# Patient Record
Sex: FEMALE | Race: WHITE | NOT HISPANIC OR LATINO | Employment: FULL TIME | URBAN - METROPOLITAN AREA
[De-identification: names, ages, dates, MRNs, and addresses within clinical notes are randomized per-mention and may not be internally consistent; named-entity substitution may affect disease eponyms.]

---

## 2017-03-07 ENCOUNTER — GENERIC CONVERSION - ENCOUNTER (OUTPATIENT)
Dept: OTHER | Facility: OTHER | Age: 32
End: 2017-03-07

## 2017-03-28 ENCOUNTER — ALLSCRIPTS OFFICE VISIT (OUTPATIENT)
Dept: OTHER | Facility: OTHER | Age: 32
End: 2017-03-28

## 2017-10-11 ENCOUNTER — GENERIC CONVERSION - ENCOUNTER (OUTPATIENT)
Dept: OTHER | Facility: OTHER | Age: 32
End: 2017-10-11

## 2017-10-25 ENCOUNTER — ALLSCRIPTS OFFICE VISIT (OUTPATIENT)
Dept: OTHER | Facility: OTHER | Age: 32
End: 2017-10-25

## 2017-10-25 DIAGNOSIS — E66.9 OBESITY: ICD-10-CM

## 2017-10-25 DIAGNOSIS — Z34.01 ENCOUNTER FOR SUPERVISION OF NORMAL FIRST PREGNANCY IN FIRST TRIMESTER: ICD-10-CM

## 2017-11-06 ENCOUNTER — APPOINTMENT (OUTPATIENT)
Dept: LAB | Facility: HOSPITAL | Age: 32
End: 2017-11-06
Payer: COMMERCIAL

## 2017-11-06 ENCOUNTER — TRANSCRIBE ORDERS (OUTPATIENT)
Dept: LAB | Facility: HOSPITAL | Age: 32
End: 2017-11-06

## 2017-11-06 DIAGNOSIS — Z34.01 ENCOUNTER FOR SUPERVISION OF NORMAL FIRST PREGNANCY IN FIRST TRIMESTER: ICD-10-CM

## 2017-11-06 DIAGNOSIS — E66.9 OBESITY: ICD-10-CM

## 2017-11-06 LAB
ABO GROUP BLD: NORMAL
BASOPHILS # BLD AUTO: 0.02 THOUSANDS/ΜL (ref 0–0.1)
BASOPHILS NFR BLD AUTO: 0 % (ref 0–1)
BILIRUB UR QL STRIP: NEGATIVE
BLD GP AB SCN SERPL QL: NEGATIVE
CLARITY UR: NORMAL
COLOR UR: YELLOW
EOSINOPHIL # BLD AUTO: 0.21 THOUSAND/ΜL (ref 0–0.61)
EOSINOPHIL NFR BLD AUTO: 2 % (ref 0–6)
ERYTHROCYTE [DISTWIDTH] IN BLOOD BY AUTOMATED COUNT: 12.4 % (ref 11.6–15.1)
GLUCOSE UR STRIP-MCNC: NEGATIVE MG/DL
HCT VFR BLD AUTO: 36.6 % (ref 34.8–46.1)
HGB BLD-MCNC: 12.8 G/DL (ref 11.5–15.4)
HGB UR QL STRIP.AUTO: NEGATIVE
KETONES UR STRIP-MCNC: NEGATIVE MG/DL
LEUKOCYTE ESTERASE UR QL STRIP: NEGATIVE
LYMPHOCYTES # BLD AUTO: 3.56 THOUSANDS/ΜL (ref 0.6–4.47)
LYMPHOCYTES NFR BLD AUTO: 36 % (ref 14–44)
MCH RBC QN AUTO: 30.4 PG (ref 26.8–34.3)
MCHC RBC AUTO-ENTMCNC: 35 G/DL (ref 31.4–37.4)
MCV RBC AUTO: 87 FL (ref 82–98)
MONOCYTES # BLD AUTO: 0.71 THOUSAND/ΜL (ref 0.17–1.22)
MONOCYTES NFR BLD AUTO: 7 % (ref 4–12)
NEUTROPHILS # BLD AUTO: 5.5 THOUSANDS/ΜL (ref 1.85–7.62)
NEUTS SEG NFR BLD AUTO: 55 % (ref 43–75)
NITRITE UR QL STRIP: NEGATIVE
NRBC BLD AUTO-RTO: 0 /100 WBCS
PH UR STRIP.AUTO: 6.5 [PH] (ref 4.5–8)
PLATELET # BLD AUTO: 250 THOUSANDS/UL (ref 149–390)
PMV BLD AUTO: 9.9 FL (ref 8.9–12.7)
PROT UR STRIP-MCNC: NEGATIVE MG/DL
RBC # BLD AUTO: 4.21 MILLION/UL (ref 3.81–5.12)
RH BLD: POSITIVE
RUBV IGG SERPL IA-ACNC: 55.4 IU/ML
SP GR UR STRIP.AUTO: 1.01 (ref 1–1.03)
UROBILINOGEN UR QL STRIP.AUTO: 0.2 E.U./DL
WBC # BLD AUTO: 10.03 THOUSAND/UL (ref 4.31–10.16)

## 2017-11-06 PROCEDURE — 82950 GLUCOSE TEST: CPT

## 2017-11-06 PROCEDURE — 87086 URINE CULTURE/COLONY COUNT: CPT

## 2017-11-06 PROCEDURE — 81003 URINALYSIS AUTO W/O SCOPE: CPT

## 2017-11-06 PROCEDURE — 80081 OBSTETRIC PANEL INC HIV TSTG: CPT

## 2017-11-06 PROCEDURE — 36415 COLL VENOUS BLD VENIPUNCTURE: CPT

## 2017-11-07 LAB
BACTERIA UR CULT: NORMAL
GLUCOSE 1H P 50 G GLC PO SERPL-MCNC: 74 MG/DL
HBV SURFACE AG SER QL: NORMAL
RPR SER QL: NORMAL

## 2017-11-08 LAB — HIV 1+2 AB+HIV1 P24 AG SERPL QL IA: NORMAL

## 2017-11-10 ENCOUNTER — GENERIC CONVERSION - ENCOUNTER (OUTPATIENT)
Dept: OTHER | Facility: OTHER | Age: 32
End: 2017-11-10

## 2017-11-10 ENCOUNTER — LAB REQUISITION (OUTPATIENT)
Dept: LAB | Facility: HOSPITAL | Age: 32
End: 2017-11-10
Payer: COMMERCIAL

## 2017-11-10 ENCOUNTER — ALLSCRIPTS OFFICE VISIT (OUTPATIENT)
Dept: PERINATAL CARE | Facility: CLINIC | Age: 32
End: 2017-11-10
Payer: COMMERCIAL

## 2017-11-10 DIAGNOSIS — Z34.01 ENCOUNTER FOR SUPERVISION OF NORMAL FIRST PREGNANCY IN FIRST TRIMESTER: ICD-10-CM

## 2017-11-10 PROCEDURE — 87591 N.GONORRHOEAE DNA AMP PROB: CPT | Performed by: NURSE PRACTITIONER

## 2017-11-10 PROCEDURE — 76813 OB US NUCHAL MEAS 1 GEST: CPT | Performed by: OBSTETRICS & GYNECOLOGY

## 2017-11-10 PROCEDURE — 87491 CHLMYD TRACH DNA AMP PROBE: CPT | Performed by: NURSE PRACTITIONER

## 2017-11-10 PROCEDURE — 76801 OB US < 14 WKS SINGLE FETUS: CPT | Performed by: OBSTETRICS & GYNECOLOGY

## 2017-11-13 LAB
CHLAMYDIA DNA CVX QL NAA+PROBE: NORMAL
N GONORRHOEA DNA GENITAL QL NAA+PROBE: NORMAL

## 2017-11-16 ENCOUNTER — GENERIC CONVERSION - ENCOUNTER (OUTPATIENT)
Dept: OTHER | Facility: OTHER | Age: 32
End: 2017-11-16

## 2017-11-20 ENCOUNTER — LAB CONVERSION - ENCOUNTER (OUTPATIENT)
Dept: OTHER | Facility: OTHER | Age: 32
End: 2017-11-20

## 2017-11-20 ENCOUNTER — GENERIC CONVERSION - ENCOUNTER (OUTPATIENT)
Dept: OTHER | Facility: OTHER | Age: 32
End: 2017-11-20

## 2017-11-20 LAB
CIGARETTE SMOKER (HISTORICAL): NORMAL
COLLECTION DATE (HISTORICAL): NORMAL
CROWN RUMP LENGTH (HISTORICAL): 80 MM
CROWN RUMP LENGTH (HISTORICAL): NORMAL MM
DATE OF BIRTH (HISTORICAL): NORMAL
DONOR AGE; EGG RETRIEVAL (HISTORICAL): NORMAL
DONOR EGG (HISTORICAL): NO
EDD DETERMINED BY (HISTORICAL): NORMAL
ESTIMATED DELIVERY DATE (EDD) (HISTORICAL): NORMAL
HX OF NEURAL TUBE DEFECTS (HISTORICAL): NO
IF TWINS (HISTORICAL): NORMAL
INSULIN DEP. DIABETIC (HISTORICAL): NO
INTERPRETATION (HISTORICAL): NORMAL
MATERNAL WEIGHT (HISTORICAL): 290 LBS
NASAL BONE (HISTORICAL): NORMAL
NASAL BONE (HISTORICAL): NORMAL
NTQR LOCATION ID (HISTORICAL): NORMAL
NTQR READING PHYS ID (HISTORICAL): NORMAL
NUCHAL TRANSLUCENCY (HISTORICAL): 1.9 MM
NUCHAL TRANSLUCENCY (HISTORICAL): NORMAL MM
NUMBER OF FETUSES (HISTORICAL): 1
PREV PREGNANCY DOWN SYND (HISTORICAL): NO
RACE/ETHNIC ORIGIN (HISTORICAL): NORMAL
REFERRING PHYSICIAN (HISTORICAL): NORMAL
REFERRING PHYSICIAN NPI (HISTORICAL): NORMAL
REFERRING PHYSICIAN PHONE (HISTORICAL): NORMAL
REPEAT SPECIMEN (HISTORICAL): NO
ULTRASONOGRAPHER ID (HISTORICAL): NORMAL
ULTRASOUND DATE (HISTORICAL): NORMAL

## 2017-12-07 ENCOUNTER — LAB CONVERSION - ENCOUNTER (OUTPATIENT)
Dept: OTHER | Facility: OTHER | Age: 32
End: 2017-12-07

## 2017-12-07 ENCOUNTER — GENERIC CONVERSION - ENCOUNTER (OUTPATIENT)
Dept: OTHER | Facility: OTHER | Age: 32
End: 2017-12-07

## 2017-12-10 ENCOUNTER — LAB CONVERSION - ENCOUNTER (OUTPATIENT)
Dept: OTHER | Facility: OTHER | Age: 32
End: 2017-12-10

## 2017-12-10 LAB
ANTITHROMBIN III ACTIVITY (HISTORICAL): 90 % NORMAL (ref 80–120)
ANTITHROMBIN III ANTIGEN (HISTORICAL): 26 MG/DL (ref 19–30)
INTERPRETATION (HISTORICAL): NORMAL
INTERPRETATION (HISTORICAL): NORMAL
MISCELLANEOUS LAB TEST RESULT (HISTORICAL): NORMAL
MISCELLANEOUS LAB TEST RESULT (HISTORICAL): NORMAL
PROTEIN C ACTIVITY (HISTORICAL): 108 % (ref 70–180)
PROTEIN S ANTIGEN FREE (HISTORICAL): 53 % NORMAL (ref 50–147)
PROTEIN S ANTIGEN TOTAL (HISTORICAL): 71 % (ref 70–140)
REVIEWED BY (HISTORICAL): NORMAL
REVIEWED BY (HISTORICAL): NORMAL

## 2017-12-11 ENCOUNTER — GENERIC CONVERSION - ENCOUNTER (OUTPATIENT)
Dept: OTHER | Facility: OTHER | Age: 32
End: 2017-12-11

## 2017-12-12 ENCOUNTER — GENERIC CONVERSION - ENCOUNTER (OUTPATIENT)
Dept: OTHER | Facility: OTHER | Age: 32
End: 2017-12-12

## 2017-12-26 ENCOUNTER — GENERIC CONVERSION - ENCOUNTER (OUTPATIENT)
Dept: OTHER | Facility: OTHER | Age: 32
End: 2017-12-26

## 2017-12-26 ENCOUNTER — ALLSCRIPTS OFFICE VISIT (OUTPATIENT)
Dept: PERINATAL CARE | Facility: CLINIC | Age: 32
End: 2017-12-26
Payer: COMMERCIAL

## 2017-12-26 PROCEDURE — 76811 OB US DETAILED SNGL FETUS: CPT | Performed by: OBSTETRICS & GYNECOLOGY

## 2017-12-26 PROCEDURE — 76817 TRANSVAGINAL US OBSTETRIC: CPT | Performed by: OBSTETRICS & GYNECOLOGY

## 2018-01-09 ENCOUNTER — GENERIC CONVERSION - ENCOUNTER (OUTPATIENT)
Dept: OTHER | Facility: OTHER | Age: 33
End: 2018-01-09

## 2018-01-09 ENCOUNTER — APPOINTMENT (OUTPATIENT)
Dept: PERINATAL CARE | Facility: CLINIC | Age: 33
End: 2018-01-09
Payer: COMMERCIAL

## 2018-01-09 PROCEDURE — 76816 OB US FOLLOW-UP PER FETUS: CPT | Performed by: OBSTETRICS & GYNECOLOGY

## 2018-01-09 NOTE — PROGRESS NOTES
NOV 10 2017         RE: Mehran Reese                                    To: [facility]  MR#: [id number]                                    [address]   : [address]   ENC: [id number]:JOSE CYRosi [address]   (Exam #: [id number])                           Fax: [phone]      The LMP of this 29year old,  G1, P0-0-0-0 patient was unknown, her   working MASTER is MAY 14 [birth year] and the current gestational age is 17 weeks 3   days by [address] [place] [address]  A sonographic examination was performed on NOV   10 2017 using real time equipment  The ultrasound examination was   performed using abdominal & vaginal techniques  The patient has a BMI of   40 4  Her blood pressure today was 124/59  Earliest US on record:   10/11/2017 9w1d  5/15/2018   MASTER      Jessa's on prenatal vitamins and denies any use of cigarettes, alcohol or   illicit drugs in pregnancy  She denies any allergies to medications  This   is her first pregnancy and she is here today for a sequential screen  She   denies any significant past medical or surgical history  She reports that   her brother had a DVT after surgery  She is not sure if he was screened   for thrombophilia but can ask him  She denies any first generation family   history of hypertension, diabetes, thrombosis or congenital anomalies  An   early Glucola was normal at 76  Multiple longitudinal and transverse sections revealed a albert   intrauterine pregnancy with the fetus in variable presentation  The   placenta is posterior in implantation, grade 0 in appearance        Cardiac motion was observed at 140 bpm       INDICATIONS      first trimester screening   morbid obesity      Exam Types      Level I   Transvaginal      RESULTS      Fetus # 1 of 1   Variable presentation   Fetal growth appeared normal      MEASUREMENTS (* Included In Average GA)      CRL              8 0 cm 13 weeks 5 days*   Nuchal Trans    1 90 mm      THE AVERAGE GESTATIONAL AGE is 13 weeks 5 days +/- 7 days  ANATOMY COMMENTS      Anatomic detail is limited at this gestational age  The fetal cranium   appeared normal in shape and the nuchal translucency was normal in size   (1 9mm)  The nasal bone was not identified secondary to unfavorable fetal   position  The intracranial anatomy was unremarkable  Evaluation of the   spine revealed no obvious evidence for a neural tube defect  Anatomy of   the fetal thorax is sub optimal due to fetal position  The cardiac rhythm   was regular and documented with M-mode  Within the abdomen, the stomach &   bladder were visualized and the abdominal wall appeared intact  A three   vessel cord appears to be present  Active movement of the fetal body &   extremities was seen  There is no suspicion of a subchorionic bleed  The   placental cord insertion appeared normal    There is no suspicion of a   uterine myoma  Free fluid is not seen in the posterior cul-de-sac  ADNEXA      The left ovary was not visualized  The right ovary was not visualized  AMNIOTIC FLUID         Largest Vertical Pocket = 4 0 cm   Amniotic Fluid: Normal      IMPRESSION      Fernández IUP   13 weeks and 5 days by this ultrasound  (MASTER=MAY 13 2018)   13 weeks and 3 days by 1st Tri Sono  (MASTER=MAY 15 2018)   Variable presentation   Fetal growth appeared normal   Regular fetal heart rate of 140 bpm   Posterior placenta      Sharri Holliday      Dear Dr Varsha Snider      Thank you for requesting a  consultation on your patient Ms Vlale   for the following indications: sequential screen      The patient was informed of the findings and counseled about the   limitations of the exam in detecting all forms of fetal congenital   abnormalities  She denies any vaginal bleeding or uterine cramping/contractions        Today's ultrasound findings and suggested follow-up were discussed in   detail with the patient  The Sequential Screen was discussed in detail,   including the sensitivities for detection of Down syndrome, Trisomy 18,   and open neural tube defects  The patient was given a lab slip to have   blood drawn for hCG and HALI-A to complete the initial component of the   Sequential Screen  Results should be available within one week  Follow up recommended:   1  Follow-up multiple marker serum screening at 16-18 weeks' gestation is   recommended to complete the Sequential Screen  2  Fetal Level II ultrasound imaging is recommended at 19-20 weeks'   gestation  3  If Jessa's brother is carrying a thrombophilia that he knows of then   was would screen Cali Gregorio for the same one  If  Jessa's brother is unsure or   did not have a thrombophilia workup then Cali Gregorio can be offered an inherited   thrombophilia screen for the following:      Prothrombin gene   Factor V Leiden gene   Free Protein S Levels   Protein C Functional assay   Antithrombin III Functional assay      4  Maternal obesity is associated with an increased risk for adverse   pregnancy outcomes, including gestational diabetes, fetal growth   abnormalities including macrosomia, fetal structural abnormalities,   preeclampsia, venous thromboembolism, stillbirth at term, and increased   likelihood for  section  Serial fetal growth evaluations are   recommended during the second half of the pregnancy, along with weekly   third trimester antepartum fetal heart rate testing from 36 weeks on  Risks of diabetes and preeclampsia and macrosomia maybe lessened by   limited weight gain of 10-15 pounds  The face to face time, in addition to time spent discussing ultrasound   results, was approximately 15 minutes, greater than 50% of which was spent   during counseling and coordination of care  LIA Olmstead M D     Maternal-Fetal Medicine   Electronically signed 11/11/17 15:15

## 2018-01-10 NOTE — MISCELLANEOUS
Message   Recorded as Task   Date: 11/15/2017 08:16 PM, Created By: Lisa Mancuso   Task Name: Review Document   Assigned To: Jesus Tirado   Regarding Patient: Madhu Parekh, Status: In Progress   Comment:    Bianca Kohler - 15 Nov 2017 8:16 PM     TASK CREATED  please order   Prothrombin gene   Factor V Leiden gene   Free Protein S Levels   Protein C Functional assay   Antithrombin III Functional assay    for Robert Gaffney if not already ordered  as per [facility] - 16 Nov 2017 8:44 AM     TASK IN PROGRESS   Mariah Worthington - 16 Nov 2017 9:14 AM     TASK EDITED  Left voicemail message today @ [phone] for Pt to call back office  Lab orders for genetic testing completed in [employer] as per Dr Ryan Munoz request    Akanksha Hamilton - 16 Nov 2017 4:33 PM     TASK EDITED  Spoke with Pt today via phone call  Pt informed that Dr Jef Mathews ordered genetic testing with regards to Pt's family history of Thrombophilia (Brother)  Lab orders for genetic testing faxed to [facility] at [address] [place] [address]  KOV#[phone] as per Pt's request   Reiterated to Pt that if she has any questions/concerns, to contact office  Active Problems    1  Class II obesity with body mass index (BMI) of greater than 35 to 39 9 and comorbidity   (278 00) (E66 9)   2  Encounter for prenatal care in first trimester of first pregnancy (V22 0) (Z34 01)   3  Influenza vaccine administered (V04 81) (Z23)   4  Maternal obesity, antepartum, first trimester (649 13,278 00) (U19 693)    Current Meds   1  Prenate Mini 18-0 6-0 4-350 MG Oral Capsule; one tablet by mouth daily  Requested for:   97UQU6410; Last Rx:[id number] Ordered    Allergies    1  No Known Drug Allergies    2  No Known Environmental Allergies   3   No Known Food Allergies    Signatures   Electronically signed by : Tate Diehl MA; Nov 16 2017  4:34PM EST                       (Author)

## 2018-01-11 ENCOUNTER — GENERIC CONVERSION - ENCOUNTER (OUTPATIENT)
Dept: OTHER | Facility: OTHER | Age: 33
End: 2018-01-11

## 2018-01-11 DIAGNOSIS — Z34.90 ENCOUNTER FOR SUPERVISION OF NORMAL PREGNANCY: ICD-10-CM

## 2018-01-12 VITALS
WEIGHT: 290 LBS | HEIGHT: 71 IN | DIASTOLIC BLOOD PRESSURE: 59 MMHG | SYSTOLIC BLOOD PRESSURE: 124 MMHG | BODY MASS INDEX: 40.6 KG/M2

## 2018-01-12 NOTE — MISCELLANEOUS
Message   Recorded as Task   Date: 03/07/2017 01:20 PM, Created By: Ginette Celis   Task Name: Med Renewal Request   Assigned To: Abiodun Wilder   Regarding Patient: Alex Andujar, Status: Active   Comment:    Nadiya Sun - 07 Mar 2017 1:20 PM     TASK CREATED  Caller: Self; Renew Medication; [phone] (Home); [phone] (Work)  pt needs refills sent to [facility] until her Mar  28 apt; she is out of refills  had been given samples  she is @[phone]  [facility] - 07 Mar 2017 2:03 PM     TASK EDITED  sent to  to sign off        Active Problems    1  Anxiety and depression (300 00,311) (F41 9,F32 9)   2  Encounter for gynecological examination without abnormal finding (V72 31) (Z01 419)    Current Meds   1  Lo Loestrin Fe 1 MG-10 MCG / 10 MCG Oral Tablet; One po daily; Therapy: 87Dll4073 to (Evaluate:23Mar2017); Last Rx:66Drh1273 Ordered   2  PROzac 20 MG Oral Capsule (FLUoxetine HCl); Therapy: (Recorded:23Fjs7437) to Recorded   3  Wellbutrin  MG Oral Tablet Extended Release 24 Hour (BuPROPion HCl ER (XL)); Therapy: (Recorded:25Qdj5853) to Recorded    Allergies    1  No Known Drug Allergies    Plan  Encounter for gynecological examination without abnormal finding    · Lo Loestrin Fe 1 MG-10 MCG / 10 MCG Oral Tablet;  One po daily    Signatures   Electronically signed by : Tony Thomas, ; Mar  7 2017  2:03PM EST                       (Author)

## 2018-01-14 VITALS — WEIGHT: 248 LBS | SYSTOLIC BLOOD PRESSURE: 120 MMHG | DIASTOLIC BLOOD PRESSURE: 68 MMHG

## 2018-01-17 NOTE — RESULT NOTES
Verified Results  (Q) STEPWISE, PART 1 [id number] 12:00AM Joaquim Evans     Test Name Result Flag Reference   INTERPRETATION TNP     TEST(S) NOT PERFORMED:      INTERPRETATION:       Age Risk Down Syndrome       YRIS Down Syndrome Risk       YRIS Trisomy 18 Risk       Calc'd Gestational Age       HALI-A       HALI-A MoM       hCG, Serum       hCG MoM       NT MoM    *************************************   * Unable to report  *   * The stated gestational age is     *   * outside the interval for which    *   * median reference values are       *   * available                          *   *************************************   REFERRING PHYSICIAN NAME KHADARJOHN L     REFERRING PHYSICIAN PHONE [phone]     REFERRING PHYSICIAN NPI [id number]     DATE OF BIRTH 1985     COLLECTION DATE 11/13/2017     ULTRASOUND DATE 11/10/2017     ULTRASONOGRAPHER'S NAME GARLAND DONIS     NTQR ULTRASONOGRAPHER ID# [id number]     NTQR LOCATION ID# N     NTQR READING PHYS ID# [id number]     [facility] ULTRASONOGRAPHER ID# NOT GIVEN     CROWN RUMP LENGTH 80 mm     NUCHAL TRANSLUCENCY 1 9 mm     IF TWINS NOT GIVEN     TWIN B CRL NOT GIVEN mm     TWIN B NT NOT GIVEN mm     MATERNAL WEIGHT 290 lbs     EST'D DATE OF DELIVERY 05/15/2018     MASTER DETERMINED BY LMP     MOTHER'S ETHNIC ORIGIN      NUMBER OF FETUSES 1     INSULIN DEPEND DIABETIC NO     REPEAT SPECIMEN NO     HX OF NEURAL TUBE DEFECTS NO     PREV PREG DOWN SYND NO     DONOR EGG NO     DONOR EGG; EGG RETRIEVAL NOT GIVEN     Nasal Bone NOT ASSESSED     Twin B Nasal Bone NOT GIVEN     Cigarette smoker NOT GIVEN

## 2018-01-22 VITALS
SYSTOLIC BLOOD PRESSURE: 132 MMHG | DIASTOLIC BLOOD PRESSURE: 76 MMHG | WEIGHT: 288.8 LBS | HEIGHT: 71 IN | BODY MASS INDEX: 40.43 KG/M2

## 2018-01-22 VITALS
SYSTOLIC BLOOD PRESSURE: 132 MMHG | HEIGHT: 70 IN | BODY MASS INDEX: 41.66 KG/M2 | WEIGHT: 291 LBS | DIASTOLIC BLOOD PRESSURE: 76 MMHG

## 2018-01-22 VITALS
WEIGHT: 254 LBS | HEIGHT: 70 IN | DIASTOLIC BLOOD PRESSURE: 90 MMHG | BODY MASS INDEX: 36.36 KG/M2 | SYSTOLIC BLOOD PRESSURE: 130 MMHG

## 2018-01-23 NOTE — MISCELLANEOUS
Message   Recorded as Task   Date: 12/12/2017 05:07 PM, Created By: Fausto Castaneda   Task Name: Go to Result   Assigned To: [facility]   Regarding Patient: Kari Talamantes, Status: In Progress   Josh Osman - 12 Dec 2017 5:07 PM     TASK CREATED  please call Lise Basurtotner her thrombophilia panel is negative   Alka Vitale - 12 Dec 2017 5:43 PM     TASK IN PROGRESS   Alka Vitale - 12 Dec 2017 5:45 PM     TASK EDITED  lm per shine [facility] thrombophilia panel result        Active Problems    1  Class II obesity with body mass index (BMI) of greater than 35 to 39 9 and comorbidity   (278 00) (E66 9)   2  Encounter for prenatal care in first trimester of first pregnancy (V22 0) (Z34 01)   3  Influenza vaccine administered (V04 81) (Z23)   4  Maternal obesity, antepartum, first trimester (649 13,278 00) (K37 020)    Current Meds   1  Prenate Mini 18-0 6-0 4-350 MG Oral Capsule; one tablet by mouth daily  Requested for:   74XOW7346; Last Rx:33Cjy1637 Ordered    Allergies    1  No Known Drug Allergies    2  No Known Environmental Allergies   3   No Known Food Allergies    Signatures   Electronically signed by : Kyra Hernández, ; Dec 12 2017  5:45PM EST                       (Author)

## 2018-01-23 NOTE — PROGRESS NOTES
DEC 26 2017         RE: Syeda Shelton                                    To: [facility]  MR#: [id number]                                    [facility]  : [address] [place] [address] Street: [id number]:CLBPLydia Stephen U  6    (Exam #: [id number])                           Fax: [phone]      The LMP of this 29year old,  G1, P0-0-0-0 patient was unknown, her   working MASTER is MAY 14 [birth year] and the current gestational age is 25 weeks 0   days by [address] [place] [address]  A sonographic examination was performed on DEC   26 2017 using real time equipment  The ultrasound examination was   performed using abdominal & vaginal techniques  The patient has a BMI of   40 2  Her blood pressure today was 132/76  Earliest US on record:   10/11/2017 9w1d  5/15/2018   MASTER      Erin Alcantara has no complaints today  She reports fetal movement and denies   vaginal bleeding  Screening for gestational diabetes on    revealed a normal one-hour post Glucola value of 74 mg/dL  A recent Quad   Screen revealed a Down syndrome risk of one in 1,370, with trisomy 18 and   open neural tube defect risks each of less than one in 5,000  Further   evaluation reveals no increased risk for abnormal placental function        Cardiac motion was observed at 156 bpm       INDICATIONS      morbid obesity   fetal anatomical survey      Exam Types      LEVEL II   Transvaginal      RESULTS      Fetus # 1 of 1   Vertex presentation   Fetal growth appeared normal   Placenta Location = Posterior   No placenta previa   Placenta Grade = I      MEASUREMENTS (* Included In Average GA)      AC              15 1 cm        20 weeks 0 days* (52%)   BPD              5 0 cm        21 weeks 1 day * (86%)   HC              18 5 cm        20 weeks 5 days* (74%)   Femur            3 7 cm        22 weeks 0 days* (86%)      Nuchal Fold      3 6 mm   NBL              6 8 mm      Humerus 3 4 cm        21 weeks 3 days  (89%)      Cerebellum       2 2 cm        21 weeks 1 day   Biorbit          3 3 cm        20 weeks 6 days   CisternaMagna    3 6 mm      HC/AC           1 23   FL/AC           0 25   FL/BPD          0 74   Ceph Index      0 77   EFW (Ac/Fl/Hc)   386 grams - 0 lbs 14 oz      THE AVERAGE GESTATIONAL AGE is 21 weeks 0 days +/- 10 days  AMNIOTIC FLUID         Largest Vertical Pocket = 3 6 cm   Amniotic Fluid: Normal      CERVICAL EVALUATION      The cervix appeared normal (Ultrasound Examination)  SUPINE      Cervical Length: 4 00 cm      OTHER TEST RESULTS           Funneling?: No             Dynamic Changes?: No        Resp  To TFP?: No                      Debris?: No      ANATOMY      Head                                    See Details   Face/Neck                               See Details   Th  Cav  Normal   Heart                                   See Details   Abd  Cav  Normal   Stomach                                 Normal   Right Kidney                            Normal   Left Kidney                             Normal   Bladder                                 Normal   Abd  Wall                               Normal   Spine                                   Normal   Extrems                                 See Details   Genitalia                               Normal   Placenta                                Normal   Umbl  Cord                              Normal   Uterus                                  Normal   PCI                                     Normal      ANATOMY DETAILS      Visualized Appearing Sonographically Normal:   HEAD: (Calvarium, BPD Level, Lateral Ventricles, Choroid Plexus);      FACE/NECK: (Neck, Profile, Orbits, Nose/Lips, Palate);    TH  CAV  :   (Lungs, Diaphragm);     HEART: (Four Chamber View, Interventricular Septum,   Interatrial Septum);    ABD  CAV : (Liver, Gall Bladder); STOMACH,   RIGHT KIDNEY, LEFT KIDNEY, BLADDER, ABD  WALL, SPINE: (Cervical Spine,   Thoracic Spine, Lumbar Spine, Sacrum);    EXTREMS: (Lt Humerus, Rt   Humerus, Lt Forearm, Rt Forearm, Rt Hand, Lt Femur, Rt Femur, Rt Low Leg,   Rt Foot);    GENITALIA (Female), PLACENTA, UMBL  CORD, UTERUS, PCI      Suboptimally Visualized:   HEAD: (Cerebellum, Cisterna Magna);    FACE/NECK: (Nuchal Fold, Face)      Not Visualized:   HEAD: (Cavum); HEART: (Proximal Left Outflow, Proximal Right Outflow,   3VV, 3 Vessel Trachea, Short Axis of Greater Vessels, Ductal Arch, Aortic   Arch, IVC, SVC, Cardiac Axis, Cardiac Position);    EXTREMS: (Lt Hand, Lt   Low Leg, Lt Foot)      ADNEXA      The left ovary was not visualized  The right ovary appeared normal and   measured 2 0 x 1 9 x 2 6 cm with a volume of 5 2 cc  IMPRESSION      Fernández IUP   21 weeks and 0 days by this ultrasound  (MASTER=MAY 8 2018)   20 weeks and 0 days by RUST Tri Sono  (MASTER=MAY 15 2018)   Vertex presentation   Fetal growth appeared normal   Regular fetal heart rate of 156 bpm   Posterior placenta   No placenta previa      GENERAL COMMENT      No fetal structural abnormality or ultrasound marker for aneuploidy is   identified on the Level II ultrasound study today  Multiple anatomic   targets are suboptimally imaged secondary to the constraints related to   maternal morbid obesity and unfavorable fetal position  Fetal growth and   amniotic fluid volume are normal   The placenta is normal in appearance  The cervix is normal in appearance by transvaginal sonography  The   cervical length is normal   Cervical debris is not present  Cervical   funneling is not present  Neither provocative nor dynamic change is   appreciated  Today's ultrasound findings and suggested follow-up were discussed in   detail with Tommas Likes  We discussed that prenatal ultrasound cannot rule out   all congenital abnormalities   Her Quad Screen and gestational diabetes screening results were discussed in detail  Linus Medina will return to the   [facility]  in 2 weeks to assess anatomic targets not imaged well   today  Reassessment of fetal interval growth will be performed in the   early third trimester for the indication of morbid obesity  Weekly non   stress testing is recommended for the indication of morbid obesity for   additional pregnancy surveillance beginning at  36 weeks gestation, sooner   if otherwise clinically indicated  Repeat screening for gestational   diabetes is recommended between 24 and 28 weeks gestation  The face to face time, in addition to time spent discussing ultrasound   results, was approximately 10 minutes, greater than 50% of which was spent   during counseling and coordination of care  LIA Soliman M D     Maternal-Fetal Medicine   Electronically signed 12/26/17 15:10

## 2018-01-23 NOTE — CONSULTS
I had the pleasure of evaluating your patient, Júnior Saldana  My full evaluation follows:      Chief Complaint  Here for ultrasound study      History of Present Illness  Please refer to the ultrasound report for additional information  Active Problems    1  Class II obesity with body mass index (BMI) of greater than 35 to 39 9 and comorbidity   (278 00) (E66 9)   2  Encounter for prenatal care in first trimester of first pregnancy (V22 0) (Z34 01)   3  Influenza vaccine administered (V04 81) (Z23)   4  Maternal obesity, antepartum, first trimester (649 13,278 00) (O99 211)    Past Medical History    · History of  1   · History of headache (V13 89) (Z87 898)   · History of Varicose Veins Of Lower Extremities (454 9)    Surgical History    · Denied: History Of Prior Surgery    Family History    · Family history of Colon Cancer (V16 0)   · Family history of Liver Cancer   · Family history of Lung Cancer (V16 1)    · Family history of deep venous thrombosis (V17 49) (Z82 49)   · Family history of Thrombophilia    Social History    · Always uses seat belt   · Being A Social Drinker   · Daily Coffee Consumption (___ Cups/Day)   · Exercise Frequency (Times/Week)   · Feels safe at home   · Marital History - Single   · Never A Smoker   · Sexually active    Current Meds   1  Prenate Mini 18-0 6-0 4-350 MG Oral Capsule; one tablet by mouth daily  Requested for:   22WXW4195; Last Rx:2017 Ordered    Allergies    1  No Known Drug Allergies    2  No Known Environmental Allergies   3  No Known Food Allergies    Vitals   Recorded: 19CYB2046 44:49MI   Systolic 757, LLE, Sitting   Diastolic 76, LLE, Sitting   Height 5 ft 11 in   Weight 288 lb 12 8 oz   BMI Calculated 40 28   BSA Calculated 2 47   Pain Scale 0     Results/Data  Exam description: level II obstetrical ultrasound, transvaginal obstetrical ultrasound  Findings: Please refer to the ultrasound report for additional information  Discussion/Summary    Please refer to the ultrasound report for additional information  The patient was counseled regarding diagnostic results, instructions for management, prognosis, impressions  Thank you very much for allowing me to participate in the care of this patient  If you have any questions, please do not hesitate to contact me        Future Appointments    Signatures   Electronically signed by : YEYO Walsh ; Dec 26 2017  3:03PM EST                       (Author)

## 2018-01-23 NOTE — RESULT NOTES
Verified Results  (Q) QUAD SCREEN [id number] 03:51PM FloraPhilip landerosvin Michelle   REPORT COMMENT:  FASTING:NO     Test Name Result Flag Reference   INTERPRETATION:      Screen negative for open NTD, Down syndrome and  Trisomy 18  MSAFP RISK OPEN NTD <1 IN 5000     AGE RISK DOWN SYNDROME 1      QUAD RISK DOWN SYNDROME 1 IN 1370     MSS3 TRISOMY 18 RISK <1 IN 5000     $MSAFP 19 2 ng/mL     ADJ MULTIPLE OF MEDIAN 0 74     ESTRIOL, UNCONJUGATED 0 66 ng/mL     ADJ MULTIPLE OF MEDIAN 0 79     HCG 23 53 IU/mL     ADJ MULTIPLE OF MEDIAN 1 18     INHIBIN A 131 pg/mL     ADJ MULTIPLE OF MEDIAN 1 03     $COMMENTS: (Report)     This patient's MASTER (estimated date of delivery)  was used to calculate the gestational age  Performance of maternal serum AFP, hCG, estriol, and  dimeric inhibin A provides a useful screening test for  detection of open neural tube defects and some  chromosomal abnormalities  It should be noted that  normal results can never guarantee the birth of a  normal baby and that 2 to 3 percent of newborns have  some type of physical or mental defect, many of which  are undetectable through any known prenatal diagnostic  technique  See Below     This is a screening test, not a diagnostic test  No  reagent system establishing the risk of chromosome  abnormalities during pregnancy has been approved by the  FDA  This risk assessment report is based in part on  demographic data provided by the ordering physician  It has been observed that patients who smoke cigarettes  during pregnancy may have a slightly increased risk of   having a false positive YRIS screen for Down syndrome or  trisomy 18  Please notify the laboratory promptly if any   data are incorrect  For assistance with recalculations,  please call your local [address] [place] [address] laboratory  For  assistance with interpretation of these results, please  contact your local [address] [place] [address] genetic counselor  or call [phone])  Interpretive Cut-offs  Screen Positive For Open NTD:  > or = 2 50 adjusted MOM                                 > or = 1 90 adjusted MOM for                                 Insulin-dependent diabetics                                 > or = 4 00 adjusted MOM for                                 Twins                                 > or = 3 50 adjusted MOM for                                 Twins insulin-dependent                                 Diabetics                                 > or = 4 50 adjusted MOM for                                 Triplets  Screen Positive For Down Syndrome: "QUAD Risk Down Syndrome"                                    that equals or exceeds                                    1 in 270  Screen Positive For Trisomy 18: "MSS3 Trisomy 18 Risk" that                                   equals or exceeds 1 in 100   GESTATIONAL AGE 17 1 weeks     MATERNAL WEIGHT 290 lbs     EST'D DATE OF DELIVERY 05/15/2018     MASTER DETERMINED BY ULTRASOUND     MOTHER'S ETHNIC ORIGIN      NUMBER OF FETUSES 1     INSULIN DEPEND DIABETIC NO     REPEAT SPECIMEN NO     HX OF NEURAL TUBE DEFECTS NO     PREV PREG DOWN SYND NO     DONOR EGG NO     Cigarette smoker NO

## 2018-01-23 NOTE — PROGRESS NOTES
2018         RE: Dominic Mederos                                    To: [facility]  MR#: [id number]                                    [place] [id number]  : [address]: [address] [place] [address]:GKDKG                             Lydia Xavier U  6    (Exam #: [id number])                           Fax: [phone]      The LMP of this 29year old,  G1, P0-0-0-0 patient was unknown, her   working MASTER is MAY 14 65 and the current gestational age is 25 weeks 0   days by  [address] [place] [address]  A sonographic examination was performed on 2018 using real time equipment  The ultrasound examination was performed   using abdominal technique  The patient has a BMI of 40 2  Her blood   pressure today was 109/64  Earliest US on record:   10/11/2017 9w1d  5/15/2018   MASTRE      Cardiac motion was observed at 157 bpm       INDICATIONS      morbid obesity   Evaluate missed anatomy      Exam Types      Level I      RESULTS      Fetus # 1 of 1   Vertex presentation   Placenta Location = Posterior   No placenta previa   Placenta Grade = II      MEASUREMENTS (* Included In Average GA)      Nuchal Fold      3 9 mm      Cerebellum       2 4 cm        22 weeks 6 days   CisternaMagna    3 7 mm      AMNIOTIC FLUID         Largest Vertical Pocket = 2 7 cm   Amniotic Fluid: Normal      ANATOMY COMMENTS      The prior fetal anatomic survey was limited in the area of the cerebellum,   cisterna magna, nuchal fold, 3vv, 3vt, ductal arch, aortic arch, lt hand,   and lt foot which were seen today and appear normal  The prior US was   limited in the area of the face,cavum, rvot, lvot, ivc/svc, and lt low leg   which is still limited on todays ultrasound due to position  No fetal   structural abnormality is identified or suspected on the Level I survey   today        IMPRESSION      Fernández IUP   22 weeks and 0 days by  Tri Sono  (MASTER=MAY 15 2018)   Vertex presentation Regular fetal heart rate of 157 bpm   Posterior placenta   No placenta previa      GENERAL COMMENT        On exam, the patient appears well, in no acute distress, and her abdomen   is nontender  The fetal anatomic survey could not be completed secondary to fetal   position and the constraints related to maternal morbid obesity  No   significant fetal abnormalities are appreciated or suspected today  We discussed follow-up in detail and I recommend the patient return in 6   weeks for a fetal growth evaluation and to attempt to complete the fetal   anatomic structures not yet visualized  Casper Mcardle, R D M S Alyne Rothman, M D     Electronically signed 01/09/18 10:54

## 2018-01-24 VITALS
HEART RATE: 109 BPM | WEIGHT: 288.02 LBS | DIASTOLIC BLOOD PRESSURE: 64 MMHG | BODY MASS INDEX: 40.32 KG/M2 | SYSTOLIC BLOOD PRESSURE: 109 MMHG | HEIGHT: 71 IN

## 2018-01-24 VITALS
WEIGHT: 289.25 LBS | HEIGHT: 71 IN | SYSTOLIC BLOOD PRESSURE: 110 MMHG | DIASTOLIC BLOOD PRESSURE: 70 MMHG | BODY MASS INDEX: 40.49 KG/M2

## 2018-01-24 VITALS — SYSTOLIC BLOOD PRESSURE: 114 MMHG | DIASTOLIC BLOOD PRESSURE: 66 MMHG | WEIGHT: 290 LBS | BODY MASS INDEX: 40.45 KG/M2

## 2018-02-02 PROBLEM — IMO0001: Status: ACTIVE | Noted: 2017-10-25

## 2018-02-02 PROBLEM — O99.212 MATERNAL MORBID OBESITY IN SECOND TRIMESTER, ANTEPARTUM (HCC): Status: ACTIVE | Noted: 2017-12-26

## 2018-02-08 ENCOUNTER — ROUTINE PRENATAL (OUTPATIENT)
Dept: OBGYN CLINIC | Facility: CLINIC | Age: 33
End: 2018-02-08

## 2018-02-08 VITALS
HEIGHT: 71 IN | DIASTOLIC BLOOD PRESSURE: 72 MMHG | WEIGHT: 293 LBS | BODY MASS INDEX: 41.02 KG/M2 | SYSTOLIC BLOOD PRESSURE: 122 MMHG

## 2018-02-08 DIAGNOSIS — Z34.92 SECOND TRIMESTER PREGNANCY: ICD-10-CM

## 2018-02-08 DIAGNOSIS — Z34.02 ENCOUNTER FOR SUPERVISION OF NORMAL FIRST PREGNANCY IN SECOND TRIMESTER: Primary | ICD-10-CM

## 2018-02-08 PROCEDURE — PNV: Performed by: OBSTETRICS & GYNECOLOGY

## 2018-02-19 LAB
BASOPHILS # BLD AUTO: 35 CELLS/UL (ref 0–200)
BASOPHILS NFR BLD AUTO: 0.3 %
EOSINOPHIL # BLD AUTO: 197 CELLS/UL (ref 15–500)
EOSINOPHIL NFR BLD AUTO: 1.7 %
ERYTHROCYTE [DISTWIDTH] IN BLOOD BY AUTOMATED COUNT: 12.6 % (ref 11–15)
GLUCOSE 1H P 50 G GLC PO SERPL-MCNC: 111 MG/DL
HCT VFR BLD AUTO: 37.2 % (ref 35–45)
HGB BLD-MCNC: 12.7 G/DL (ref 11.7–15.5)
LYMPHOCYTES # BLD AUTO: 2308 CELLS/UL (ref 850–3900)
LYMPHOCYTES NFR BLD AUTO: 19.9 %
MCH RBC QN AUTO: 31.2 PG (ref 27–33)
MCHC RBC AUTO-ENTMCNC: 34.1 G/DL (ref 32–36)
MCV RBC AUTO: 91.4 FL (ref 80–100)
MONOCYTES # BLD AUTO: 777 CELLS/UL (ref 200–950)
MONOCYTES NFR BLD AUTO: 6.7 %
NEUTROPHILS # BLD AUTO: 8282 CELLS/UL (ref 1500–7800)
NEUTROPHILS NFR BLD AUTO: 71.4 %
PLATELET # BLD AUTO: 287 THOUSAND/UL (ref 140–400)
PMV BLD REES-ECKER: 10.3 FL (ref 7.5–12.5)
RBC # BLD AUTO: 4.07 MILLION/UL (ref 3.8–5.1)
RPR SER QL: NORMAL
WBC # BLD AUTO: 11.6 THOUSAND/UL (ref 3.8–10.8)

## 2018-02-21 ENCOUNTER — ROUTINE PRENATAL (OUTPATIENT)
Dept: OBGYN CLINIC | Facility: CLINIC | Age: 33
End: 2018-02-21

## 2018-02-21 VITALS — WEIGHT: 293 LBS | BODY MASS INDEX: 41.42 KG/M2 | SYSTOLIC BLOOD PRESSURE: 116 MMHG | DIASTOLIC BLOOD PRESSURE: 64 MMHG

## 2018-02-21 DIAGNOSIS — Z34.03 ENCOUNTER FOR SUPERVISION OF NORMAL FIRST PREGNANCY IN THIRD TRIMESTER: Primary | ICD-10-CM

## 2018-02-21 PROBLEM — IMO0001: Status: RESOLVED | Noted: 2017-10-25 | Resolved: 2018-02-21

## 2018-02-21 PROCEDURE — PNV: Performed by: PHYSICIAN ASSISTANT

## 2018-02-21 NOTE — ASSESSMENT & PLAN NOTE
RTO 2 weeks  For [facility] US tomorrow    Reviewed PTL precautions, fetal kick counts and reasons to call

## 2018-02-22 ENCOUNTER — ULTRASOUND (OUTPATIENT)
Dept: PERINATAL CARE | Facility: CLINIC | Age: 33
End: 2018-02-22
Payer: COMMERCIAL

## 2018-02-22 VITALS
SYSTOLIC BLOOD PRESSURE: 129 MMHG | DIASTOLIC BLOOD PRESSURE: 67 MMHG | HEART RATE: 81 BPM | WEIGHT: 293 LBS | HEIGHT: 71 IN | BODY MASS INDEX: 41.02 KG/M2

## 2018-02-22 DIAGNOSIS — E66.01 MATERNAL MORBID OBESITY IN THIRD TRIMESTER, ANTEPARTUM (HCC): Primary | ICD-10-CM

## 2018-02-22 DIAGNOSIS — Z36.89 ENCOUNTER FOR FETAL ANATOMIC SURVEY: ICD-10-CM

## 2018-02-22 DIAGNOSIS — O99.213 MATERNAL MORBID OBESITY IN THIRD TRIMESTER, ANTEPARTUM (HCC): Primary | ICD-10-CM

## 2018-02-22 PROBLEM — O99.212 MATERNAL MORBID OBESITY IN SECOND TRIMESTER, ANTEPARTUM (HCC): Status: RESOLVED | Noted: 2017-12-26 | Resolved: 2018-02-22

## 2018-02-22 PROCEDURE — 76816 OB US FOLLOW-UP PER FETUS: CPT | Performed by: OBSTETRICS & GYNECOLOGY

## 2018-02-22 PROCEDURE — 99212 OFFICE O/P EST SF 10 MIN: CPT | Performed by: OBSTETRICS & GYNECOLOGY

## 2018-02-22 NOTE — PROGRESS NOTES
Please refer to the ultrasound report for additional information regarding the MFM assessment today

## 2018-02-22 NOTE — LETTER
February 22, 2018     Gina Lr MD  [address] [place] [id number]    Patient: Amelie Washington   YOB: 1985   Date of Visit: 2/22/2018       Dear Dr Fabrizio Teixeira: Thank you for referring Amelie Washington to me for evaluation  Below are my notes for this consultation  If you have questions, please do not hesitate to call me  I look forward to following your patient along with you  Sincerely,        Evelyn Howard MD        CC: No Recipients  Evelyn Howard MD  2/22/2018  4:11 PM  Sign at close encounter  Please refer to the ultrasound report for additional information regarding the MFM assessment today

## 2018-03-07 NOTE — PROGRESS NOTES
Education  Baby & Me Education 1st Trimester:   First Trimester Education provided:   benefits of breastfeeding, importance of exclusive breastfeeding, early initiation of breastfeeding, exclusive breastfeeding for the first 6 months and Pregnancy Essentials Reference Guide given   The patient is planning on breastfeeding     Prenatal education provided by: Yeny Miranda      Signatures   Electronically signed by : Yeny Miranda OM; Oct 25 2017  3:51PM EST                       (Author)

## 2018-03-22 ENCOUNTER — ROUTINE PRENATAL (OUTPATIENT)
Dept: OBGYN CLINIC | Facility: CLINIC | Age: 33
End: 2018-03-22

## 2018-03-22 VITALS — BODY MASS INDEX: 41.7 KG/M2 | DIASTOLIC BLOOD PRESSURE: 64 MMHG | WEIGHT: 293 LBS | SYSTOLIC BLOOD PRESSURE: 122 MMHG

## 2018-03-22 DIAGNOSIS — Z34.03 ENCOUNTER FOR SUPERVISION OF NORMAL FIRST PREGNANCY IN THIRD TRIMESTER: Primary | ICD-10-CM

## 2018-03-22 PROCEDURE — PNV: Performed by: PHYSICIAN ASSISTANT

## 2018-03-22 NOTE — PROGRESS NOTES
Patient w/o complaints  (+) good fetal movement, denies any bleeding, fluid leakage or ctx  28wk growth US WNL for 36 wk growth Us  Problem List Items Addressed This Visit     Encounter for supervision of normal first pregnancy in third trimester - Primary     RTO 2 weeks  Breast pump slip and check in card given  For 36wk growth US with [address]  [facility] for obesity  Reviewed PTL precautions, fetal kick counts and reasons to call

## 2018-03-22 NOTE — ASSESSMENT & PLAN NOTE
RTO 2 weeks  Breast pump slip and check in card given  For 36wk growth US with [facility] for obesity  Reviewed PTL precautions, fetal kick counts and reasons to call

## 2018-04-05 ENCOUNTER — ROUTINE PRENATAL (OUTPATIENT)
Dept: OBGYN CLINIC | Facility: CLINIC | Age: 33
End: 2018-04-05

## 2018-04-05 VITALS — BODY MASS INDEX: 42.96 KG/M2 | SYSTOLIC BLOOD PRESSURE: 118 MMHG | DIASTOLIC BLOOD PRESSURE: 66 MMHG | WEIGHT: 293 LBS

## 2018-04-05 DIAGNOSIS — Z34.03 ENCOUNTER FOR SUPERVISION OF NORMAL FIRST PREGNANCY IN THIRD TRIMESTER: Primary | ICD-10-CM

## 2018-04-05 PROCEDURE — PNV: Performed by: PHYSICIAN ASSISTANT

## 2018-04-05 NOTE — ASSESSMENT & PLAN NOTE
RTO 2 weeks  For [facility] 4/19/18  Reviewed PTL precautions, fetal kick counts and reasons to call

## 2018-04-05 NOTE — PROGRESS NOTES
Patient w/o complaints  (+) good fetal movement, denies any bleeding fluid leakage or ctx  She is concerned about 9lb weight gain since last visit, however she is eating the same, discussed healthy choices and portion control also if feels up to it light walking may help  She has check in appointment scheduled      Problem List Items Addressed This Visit     Encounter for supervision of normal first pregnancy in third trimester - Primary     RTO 2 weeks  For [facility] 4/19/18  Reviewed PTL precautions, fetal kick counts and reasons to call

## 2018-04-16 ENCOUNTER — TELEPHONE (OUTPATIENT)
Dept: OBGYN CLINIC | Facility: CLINIC | Age: 33
End: 2018-04-16

## 2018-04-16 NOTE — TELEPHONE ENCOUNTER
Patient already has an appointment scheduled with Ina Paulino for 04/19 for a PNAT appointment - advised her to tell the MA that she wants the T-Dap injection that day

## 2018-04-16 NOTE — TELEPHONE ENCOUNTER
Pt called stating that she wanted to know if she could have the TDap inj completed at our office  Pt is 36wks and states she was told that she needed to have it done this week for the baby to be born immune  Pt states that she contacted her PCP and won't be able to see him until after she delivers and at that point she will have missed her window  Pt would like a call back  Pt was also scheduled for PN this week   She requests a later appt and was adding to the wait list      Best number to reach pt: [phone]

## 2018-04-19 ENCOUNTER — ULTRASOUND (OUTPATIENT)
Dept: PERINATAL CARE | Facility: CLINIC | Age: 33
End: 2018-04-19
Payer: COMMERCIAL

## 2018-04-19 ENCOUNTER — ROUTINE PRENATAL (OUTPATIENT)
Dept: OBGYN CLINIC | Facility: CLINIC | Age: 33
End: 2018-04-19
Payer: COMMERCIAL

## 2018-04-19 VITALS
HEART RATE: 96 BPM | BODY MASS INDEX: 41.02 KG/M2 | SYSTOLIC BLOOD PRESSURE: 131 MMHG | DIASTOLIC BLOOD PRESSURE: 70 MMHG | WEIGHT: 293 LBS | HEIGHT: 71 IN

## 2018-04-19 DIAGNOSIS — Z23 NEED FOR TETANUS, DIPHTHERIA, AND ACELLULAR PERTUSSIS (TDAP) VACCINE IN PATIENT OF ADOLESCENT AGE OR OLDER: ICD-10-CM

## 2018-04-19 DIAGNOSIS — O99.213 MATERNAL MORBID OBESITY IN THIRD TRIMESTER, ANTEPARTUM (HCC): ICD-10-CM

## 2018-04-19 DIAGNOSIS — Z3A.36 36 WEEKS GESTATION OF PREGNANCY: ICD-10-CM

## 2018-04-19 DIAGNOSIS — Z34.93 ENCOUNTER FOR PREGNANCY RELATED EXAMINATION IN THIRD TRIMESTER: ICD-10-CM

## 2018-04-19 DIAGNOSIS — E66.01 MATERNAL MORBID OBESITY IN THIRD TRIMESTER, ANTEPARTUM (HCC): ICD-10-CM

## 2018-04-19 DIAGNOSIS — Z34.03 ENCOUNTER FOR SUPERVISION OF NORMAL FIRST PREGNANCY IN THIRD TRIMESTER: Primary | ICD-10-CM

## 2018-04-19 PROCEDURE — 90715 TDAP VACCINE 7 YRS/> IM: CPT | Performed by: NURSE PRACTITIONER

## 2018-04-19 PROCEDURE — 90471 IMMUNIZATION ADMIN: CPT | Performed by: NURSE PRACTITIONER

## 2018-04-19 PROCEDURE — PNV: Performed by: NURSE PRACTITIONER

## 2018-04-19 PROCEDURE — 59025 FETAL NON-STRESS TEST: CPT | Performed by: OBSTETRICS & GYNECOLOGY

## 2018-04-19 PROCEDURE — 87653 STREP B DNA AMP PROBE: CPT | Performed by: NURSE PRACTITIONER

## 2018-04-19 PROCEDURE — 76816 OB US FOLLOW-UP PER FETUS: CPT | Performed by: OBSTETRICS & GYNECOLOGY

## 2018-04-19 PROCEDURE — 99213 OFFICE O/P EST LOW 20 MIN: CPT | Performed by: OBSTETRICS & GYNECOLOGY

## 2018-04-19 RX ORDER — CETIRIZINE HYDROCHLORIDE 10 MG/1
10 TABLET ORAL AS NEEDED
COMMUNITY
End: 2019-01-31

## 2018-04-19 NOTE — ASSESSMENT & PLAN NOTE
Denies OB complaints  Good fetal movement  Denies contractions, cramping, leakage of fluid or vaginal bleeding  GBS collected  Tdap administered  S/p flu vaccine  Baby and Me considerations reinforced  Reviewed labor precautions and FKCs

## 2018-04-19 NOTE — PROGRESS NOTES
Problem List Items Addressed This Visit     Encounter for supervision of normal first pregnancy in third trimester - Primary     Denies OB complaints  Good fetal movement  Denies contractions, cramping, leakage of fluid or vaginal bleeding  GBS collected  Tdap administered  S/p flu vaccine  Baby and Me considerations reinforced  Reviewed labor precautions and FKCs                Other Visit Diagnoses     Encounter for pregnancy related examination in third trimester        Relevant Orders    TDAP VACCINE GREATER THAN OR EQUAL TO 6YO IM (Completed)    Strep B DNA probe, amplification    Need for tetanus, diphtheria, and acellular pertussis (Tdap) vaccine in patient of adolescent age or older        Relevant Orders    TDAP VACCINE GREATER THAN OR EQUAL TO 6YO IM (Completed)

## 2018-04-20 ENCOUNTER — TELEPHONE (OUTPATIENT)
Dept: OBGYN CLINIC | Facility: CLINIC | Age: 33
End: 2018-04-20

## 2018-04-21 LAB — GP B STREP DNA SPEC QL NAA+PROBE: NORMAL

## 2018-04-23 ENCOUNTER — TELEPHONE (OUTPATIENT)
Dept: OBGYN CLINIC | Facility: CLINIC | Age: 33
End: 2018-04-23

## 2018-04-23 NOTE — TELEPHONE ENCOUNTER
Pt is scheduled for version on Thursday 4/26 at 12:30 pm with Rk  L&D aware  LMOM for pt to notify her of the date and time

## 2018-04-23 NOTE — TELEPHONE ENCOUNTER
----- Message from Bry Garcia sent at 4/23/2018  8:53 AM EDT -----  Regarding: Version  Dr Inge Giron spoke with this patient on 4/19 and she is breech and would like a version  Can you please see if one of your physicians is interested in performing the version and let me know? Thanks!

## 2018-04-26 ENCOUNTER — HOSPITAL ENCOUNTER (OUTPATIENT)
Facility: HOSPITAL | Age: 33
Discharge: HOME | End: 2018-04-26
Attending: OBSTETRICS & GYNECOLOGY | Admitting: OBSTETRICS & GYNECOLOGY
Payer: COMMERCIAL

## 2018-04-26 VITALS
HEIGHT: 71 IN | BODY MASS INDEX: 41.02 KG/M2 | DIASTOLIC BLOOD PRESSURE: 82 MMHG | WEIGHT: 293 LBS | RESPIRATION RATE: 16 BRPM | TEMPERATURE: 98.8 F | SYSTOLIC BLOOD PRESSURE: 147 MMHG | HEART RATE: 95 BPM

## 2018-04-26 LAB
ERYTHROCYTE [DISTWIDTH] IN BLOOD BY AUTOMATED COUNT: 12.9 % (ref 11.6–15.1)
HCT VFR BLD AUTO: 42.2 % (ref 34.8–46.1)
HGB BLD-MCNC: 14.2 G/DL (ref 11.5–15.4)
MCH RBC QN AUTO: 30.2 PG (ref 26.8–34.3)
MCHC RBC AUTO-ENTMCNC: 33.6 G/DL (ref 31.4–37.4)
MCV RBC AUTO: 90 FL (ref 82–98)
PLATELET # BLD AUTO: 263 THOUSANDS/UL (ref 149–390)
PMV BLD AUTO: 10.5 FL (ref 8.9–12.7)
RBC # BLD AUTO: 4.7 MILLION/UL (ref 3.81–5.12)
WBC # BLD AUTO: 14.2 THOUSAND/UL (ref 4.31–10.16)

## 2018-04-26 PROCEDURE — 85027 COMPLETE CBC AUTOMATED: CPT | Performed by: OBSTETRICS & GYNECOLOGY

## 2018-04-26 PROCEDURE — 59412 ANTEPARTUM MANIPULATION: CPT

## 2018-04-26 PROCEDURE — 59412 ANTEPARTUM MANIPULATION: CPT | Performed by: OBSTETRICS & GYNECOLOGY

## 2018-04-26 RX ORDER — TERBUTALINE SULFATE 1 MG/ML
0.25 INJECTION, SOLUTION SUBCUTANEOUS ONCE
Status: COMPLETED | OUTPATIENT
Start: 2018-04-26 | End: 2018-04-26

## 2018-04-26 RX ORDER — SODIUM CHLORIDE, SODIUM LACTATE, POTASSIUM CHLORIDE, CALCIUM CHLORIDE 600; 310; 30; 20 MG/100ML; MG/100ML; MG/100ML; MG/100ML
125 INJECTION, SOLUTION INTRAVENOUS CONTINUOUS
Status: DISCONTINUED | OUTPATIENT
Start: 2018-04-26 | End: 2018-04-26 | Stop reason: HOSPADM

## 2018-04-26 RX ADMIN — TERBUTALINE SULFATE 0.25 MG: 1 INJECTION SUBCUTANEOUS at 13:18

## 2018-04-26 RX ADMIN — SODIUM CHLORIDE, SODIUM LACTATE, POTASSIUM CHLORIDE, AND CALCIUM CHLORIDE 125 ML/HR: .6; .31; .03; .02 INJECTION, SOLUTION INTRAVENOUS at 14:17

## 2018-04-26 RX ADMIN — SODIUM CHLORIDE, SODIUM LACTATE, POTASSIUM CHLORIDE, AND CALCIUM CHLORIDE 125 ML/HR: .6; .31; .03; .02 INJECTION, SOLUTION INTRAVENOUS at 13:10

## 2018-04-26 NOTE — PROCEDURES
Time completed by Dr Alea Fan  Procedure start at [phone]  Positive version at 0392  End procedure 1327

## 2018-04-26 NOTE — PROGRESS NOTES
H&P Exam - Obstetrics   Ciera Eckert 29 y o  female MRN: [id number]  Unit/Bed#: LD Triage  Encounter: [id number]    <2 Midnights    OUTPATIENT NO CHARGE BED    History of Present Illness   Chief Complaint: External cephalic version    HPI:  Ciera Eckert is a 29 y o   female with an MASTER of 5/15/2018, by Ultrasound at 37w2d weeks gestation who is being admitted for External cephalic version  Her current obstetrical history is significant for breech presentation  Contractions: None  Leakage of fluid: None  Bleeding: None  Fetal movement: present  Pregnancy complications: breech presentation  Review of Systems    Historical Information   OB History    Para Term  AB Living   1             SAB TAB Ectopic Multiple Live Births                  # Outcome Date GA Lbr Gerson/2nd Weight Sex Delivery Anes PTL Lv   1 Current                 Baby complications/comments: none  Past Medical History:   Diagnosis Date    Anxiety     Migraine     Varicella     childhood    Varicose veins of both lower extremities      Past Surgical History:   Procedure Laterality Date    NO PAST SURGERIES       Social History   History   Alcohol Use No     History   Drug Use No     History   Smoking Status    Never Smoker   Smokeless Tobacco    Never Used     Family History: non-contributory    Meds/Allergies   {  Prescriptions Prior to Admission   Medication    cetirizine (ZyrTEC) 10 mg tablet    Prenatal MV & Min w/FA-DHA (PRENATAL ADULT GUMMY/DHA/FA PO)     No Known Allergies    Objective   Vitals: Blood pressure 132/86, pulse 93, temperature 98 8 °F (37 1 °C), temperature source Oral, resp  rate 16, height 5' 11" (1 803 m), weight (!) 142 kg (313 lb), last menstrual period 2017, currently breastfeeding  Body mass index is 43 65 kg/m²      Invasive Devices          No matching active lines, drains, or airways          Physical Exam  not evaluated  abd gravid NT  Breech by Leopold's    Fetal heart rate  moderate  Baseline: 135 bpm  Prenatal Labs:   Blood Type:   Lab Results   Component Value Date/Time    ABO Grouping O 11/06/2017 05:39 PM     , D (Rh type):   Lab Results   Component Value Date/Time    Rh Factor Positive 11/06/2017 05:39 PM     , Antibody Screen:   Lab Results   Component Value Date/Time    Antibody Screen Negative 11/06/2017 05:39 PM    , HCT/HGB:   Lab Results   Component Value Date/Time    Hematocrit 37 2 02/17/2018 09:11 AM    Hematocrit 36 6 11/06/2017 05:39 PM    Hemoglobin 12 7 02/17/2018 09:11 AM    Hemoglobin 12 8 11/06/2017 05:39 PM      , MCV:   Lab Results   Component Value Date/Time    MCV 91 4 02/17/2018 09:11 AM    MCV 87 11/06/2017 05:39 PM      , Platelets:   Lab Results   Component Value Date/Time    Platelets 439 20/44/8869 05:39 PM    Platelet Count 626 90/13/1579 09:11 AM      , 1 hour Glucola:   Lab Results   Component Value Date/Time    Glucose 111 02/17/2018 09:11 AM   , Varicella: No results found for: VARICELLAIGG    , Rubella:   Lab Results   Component Value Date/Time    Rubella IgG Quant 55 4 11/06/2017 05:39 PM        , VDRL/RPR:   Lab Results   Component Value Date/Time    RPR NON-REACTIVE 02/17/2018 09:11 AM    RPR Non-Reactive 11/06/2017 05:39 PM      , Hep B:   Lab Results   Component Value Date/Time    Hepatitis B Surface Ag Non-reactive 11/06/2017 05:39 PM     , Hep C: No components found for: HEPCSAG, EXTHEPCSAG   , HIV:   Lab Results   Component Value Date/Time    HIV-1/HIV-2 Ab Non-Reactive 11/06/2017 05:39 PM         Imaging, EKG, Pathology, and Other Studies: I have personally reviewed pertinent reports          Brief bedside AUS:  Breech presentation with spine maternal right; variant mono-complete; posterior placenta; HAYDEN 8-9cm  Assessment/Plan     Assessment:  Breech presentation  Plan:  ECV  Consent obtained  IV, SQ terbuatine

## 2018-04-26 NOTE — PROCEDURES
Bora Mccann, a  at 37w2d with an MASTER of 5/15/2018, by Ultrasound, was seen at [address] [facility] for the following procedure(s):  ]    Operative timeout accomplished  SQ terbutaline preprocedure  FHT visually 140 by abd U/S prior to start  ECV initiated with forward roll technique  Successful with first attempt  Total time <2 minutes  FHT visually normal throughout procedure  Fetus confirmed vertex by abd U/S at completion of procedure  "s on monitor  Patient tolerated procedure well  Advised NST tomorrow in [facility]  Pt will call to arrange  Post-procedure precautions reviewed

## 2018-04-27 ENCOUNTER — ROUTINE PRENATAL (OUTPATIENT)
Dept: PERINATAL CARE | Facility: CLINIC | Age: 33
End: 2018-04-27
Payer: COMMERCIAL

## 2018-04-27 VITALS
HEART RATE: 89 BPM | BODY MASS INDEX: 41.02 KG/M2 | SYSTOLIC BLOOD PRESSURE: 118 MMHG | WEIGHT: 293 LBS | DIASTOLIC BLOOD PRESSURE: 81 MMHG | HEIGHT: 71 IN

## 2018-04-27 DIAGNOSIS — Z3A.37 37 WEEKS GESTATION OF PREGNANCY: ICD-10-CM

## 2018-04-27 DIAGNOSIS — E66.01 MATERNAL MORBID OBESITY IN THIRD TRIMESTER, ANTEPARTUM (HCC): Primary | ICD-10-CM

## 2018-04-27 DIAGNOSIS — O99.213 MATERNAL MORBID OBESITY IN THIRD TRIMESTER, ANTEPARTUM (HCC): Primary | ICD-10-CM

## 2018-04-27 PROCEDURE — 59025 FETAL NON-STRESS TEST: CPT | Performed by: OBSTETRICS & GYNECOLOGY

## 2018-04-27 PROCEDURE — 76815 OB US LIMITED FETUS(S): CPT | Performed by: OBSTETRICS & GYNECOLOGY

## 2018-05-01 ENCOUNTER — ROUTINE PRENATAL (OUTPATIENT)
Dept: OBGYN CLINIC | Facility: CLINIC | Age: 33
End: 2018-05-01

## 2018-05-01 VITALS — WEIGHT: 293 LBS | DIASTOLIC BLOOD PRESSURE: 72 MMHG | BODY MASS INDEX: 43.35 KG/M2 | SYSTOLIC BLOOD PRESSURE: 126 MMHG

## 2018-05-01 DIAGNOSIS — Z34.03 ENCOUNTER FOR SUPERVISION OF NORMAL FIRST PREGNANCY IN THIRD TRIMESTER: Primary | ICD-10-CM

## 2018-05-01 DIAGNOSIS — H53.9 CHANGE IN VISION: ICD-10-CM

## 2018-05-01 DIAGNOSIS — IMO0001: ICD-10-CM

## 2018-05-01 PROCEDURE — PNV: Performed by: NURSE PRACTITIONER

## 2018-05-01 NOTE — ASSESSMENT & PLAN NOTE
Denies OB complaints  Good fetal movement  Denies contractions, cramping, leakage of fluid or vaginal bleeding  GBS neg  S/p flu and tdap vaccines  Baby and Me considerations reinforced  Reviewed labor precautions and FKCs

## 2018-05-01 NOTE — ASSESSMENT & PLAN NOTE
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

## 2018-05-01 NOTE — PROGRESS NOTES
Problem List Items Addressed This Visit     Encounter for supervision of normal first pregnancy in third trimester - Primary     Denies OB complaints  Good fetal movement  Denies contractions, cramping, leakage of fluid or vaginal bleeding  GBS neg  S/p flu and tdap vaccines  Baby and Me considerations reinforced  Reviewed labor precautions and FKCs  Cephalic version     Successful ECV on 18  The patient denies post-procedure complaints  Vertex pres confirmed on US on 18  Leopold's today consistent with vertex pres  Change in vision     Pt reports 2 episodes of vision change since last visit  This is described as gray area in field of vision  First occurred 5d ago and lasted about 30 mins; occurred again yesterday and lasted for 20 mins  BP was checked by the school nurse when this occurred at work and BP was 138/94  Today she is normotensive and urine protein is neg  BP has been normal overall at routine OB and  testing visits  She denies sx of preE  Recommended close observation for further sx and low threshold for calling  She will report to school nurse for BP check if this occurs at school and will call us immed  F/u at [facility] for  testing on Thurs as scheduled  Advised we would recommend preE labs if sx continue

## 2018-05-01 NOTE — ASSESSMENT & PLAN NOTE
Successful ECV on 4/26/18  The patient denies post-procedure complaints  Vertex pres confirmed on US on 4/27/18  Leopold's today consistent with vertex pres

## 2018-05-02 ENCOUNTER — TELEPHONE (OUTPATIENT)
Dept: OBGYN CLINIC | Facility: CLINIC | Age: 33
End: 2018-05-02

## 2018-05-02 NOTE — TELEPHONE ENCOUNTER
Spoke with pt - no headaches now, just jane vaughn  She has an appointment scheduled for tomorrow with [facility] for NST and BP check  She will call after if need be  Did offer her an appointment with [facility], but declined at this time

## 2018-05-02 NOTE — TELEPHONE ENCOUNTER
Spoke with pt - 38w1d   - was seen yesterday for PN checkup  Mentioned headaches - she still had the headache through the night  Pain at back of head and jaw - had floaters  During the night had slight cramping - no bleeding or any fluids leakage  Great fetal movement  Patient is worried about the headaches since this time she had floaters  Floaters gone now, headache has slightly diminished  Please advise  Thanks!

## 2018-05-03 ENCOUNTER — ROUTINE PRENATAL (OUTPATIENT)
Dept: PERINATAL CARE | Facility: CLINIC | Age: 33
End: 2018-05-03
Payer: COMMERCIAL

## 2018-05-03 VITALS
HEIGHT: 71 IN | SYSTOLIC BLOOD PRESSURE: 125 MMHG | BODY MASS INDEX: 41.02 KG/M2 | HEART RATE: 93 BPM | WEIGHT: 293 LBS | DIASTOLIC BLOOD PRESSURE: 85 MMHG

## 2018-05-03 DIAGNOSIS — O99.213 MATERNAL MORBID OBESITY IN THIRD TRIMESTER, ANTEPARTUM (HCC): Primary | ICD-10-CM

## 2018-05-03 DIAGNOSIS — Z3A.38 38 WEEKS GESTATION OF PREGNANCY: ICD-10-CM

## 2018-05-03 DIAGNOSIS — E66.01 MATERNAL MORBID OBESITY IN THIRD TRIMESTER, ANTEPARTUM (HCC): Primary | ICD-10-CM

## 2018-05-03 PROCEDURE — 76815 OB US LIMITED FETUS(S): CPT | Performed by: OBSTETRICS & GYNECOLOGY

## 2018-05-03 PROCEDURE — 59025 FETAL NON-STRESS TEST: CPT | Performed by: OBSTETRICS & GYNECOLOGY

## 2018-05-09 ENCOUNTER — ROUTINE PRENATAL (OUTPATIENT)
Dept: OBGYN CLINIC | Facility: CLINIC | Age: 33
End: 2018-05-09

## 2018-05-09 VITALS — BODY MASS INDEX: 43.1 KG/M2 | WEIGHT: 293 LBS | SYSTOLIC BLOOD PRESSURE: 120 MMHG | DIASTOLIC BLOOD PRESSURE: 82 MMHG

## 2018-05-09 DIAGNOSIS — H53.9 CHANGE IN VISION: ICD-10-CM

## 2018-05-09 DIAGNOSIS — Z34.03 ENCOUNTER FOR SUPERVISION OF NORMAL FIRST PREGNANCY IN THIRD TRIMESTER: Primary | ICD-10-CM

## 2018-05-09 DIAGNOSIS — IMO0001: ICD-10-CM

## 2018-05-09 PROCEDURE — PNV: Performed by: PHYSICIAN ASSISTANT

## 2018-05-09 RX ORDER — CALCIUM CARBONATE 750 MG/1
1 TABLET, CHEWABLE ORAL DAILY
COMMUNITY
End: 2018-07-02 | Stop reason: ALTCHOICE

## 2018-05-09 NOTE — ASSESSMENT & PLAN NOTE
Feels well overall, but ready to deliver "I'm over this "   Good fetal movement   It's a girl - Maria R  GBS neg  Going for APFS for obesity  Labor precautions reviewed

## 2018-05-09 NOTE — PROGRESS NOTES
Problem List Items Addressed This Visit     Encounter for supervision of normal first pregnancy in third trimester - Primary     Feels well overall, but ready to deliver "I'm over this "   Good fetal movement   It's a girl - Maria R  GBS neg  Going for APFS for obesity  Labor precautions reviewed         Cephalic version    Change in vision

## 2018-05-10 ENCOUNTER — ROUTINE PRENATAL (OUTPATIENT)
Dept: PERINATAL CARE | Facility: CLINIC | Age: 33
End: 2018-05-10
Payer: COMMERCIAL

## 2018-05-10 VITALS
BODY MASS INDEX: 41.02 KG/M2 | SYSTOLIC BLOOD PRESSURE: 126 MMHG | DIASTOLIC BLOOD PRESSURE: 86 MMHG | WEIGHT: 293 LBS | HEART RATE: 99 BPM | HEIGHT: 71 IN

## 2018-05-10 DIAGNOSIS — O99.213 MATERNAL MORBID OBESITY IN THIRD TRIMESTER, ANTEPARTUM (HCC): Primary | ICD-10-CM

## 2018-05-10 DIAGNOSIS — E66.01 MATERNAL MORBID OBESITY IN THIRD TRIMESTER, ANTEPARTUM (HCC): Primary | ICD-10-CM

## 2018-05-10 DIAGNOSIS — Z3A.39 39 WEEKS GESTATION OF PREGNANCY: ICD-10-CM

## 2018-05-10 PROCEDURE — 76815 OB US LIMITED FETUS(S): CPT | Performed by: OBSTETRICS & GYNECOLOGY

## 2018-05-10 PROCEDURE — 59025 FETAL NON-STRESS TEST: CPT | Performed by: OBSTETRICS & GYNECOLOGY

## 2018-05-17 ENCOUNTER — ROUTINE PRENATAL (OUTPATIENT)
Dept: PERINATAL CARE | Facility: CLINIC | Age: 33
End: 2018-05-17
Payer: COMMERCIAL

## 2018-05-17 ENCOUNTER — ROUTINE PRENATAL (OUTPATIENT)
Dept: OBGYN CLINIC | Facility: CLINIC | Age: 33
End: 2018-05-17

## 2018-05-17 VITALS
HEIGHT: 71 IN | BODY MASS INDEX: 41.02 KG/M2 | HEART RATE: 112 BPM | WEIGHT: 293 LBS | DIASTOLIC BLOOD PRESSURE: 88 MMHG | SYSTOLIC BLOOD PRESSURE: 129 MMHG

## 2018-05-17 VITALS — DIASTOLIC BLOOD PRESSURE: 72 MMHG | WEIGHT: 293 LBS | SYSTOLIC BLOOD PRESSURE: 124 MMHG | BODY MASS INDEX: 43.82 KG/M2

## 2018-05-17 DIAGNOSIS — Z34.03 ENCOUNTER FOR SUPERVISION OF NORMAL FIRST PREGNANCY IN THIRD TRIMESTER: Primary | ICD-10-CM

## 2018-05-17 DIAGNOSIS — E66.01 MATERNAL MORBID OBESITY IN THIRD TRIMESTER, ANTEPARTUM (HCC): Primary | ICD-10-CM

## 2018-05-17 DIAGNOSIS — O48.0 POST TERM PREGNANCY OVER 40 WEEKS: ICD-10-CM

## 2018-05-17 DIAGNOSIS — O99.213 MATERNAL MORBID OBESITY IN THIRD TRIMESTER, ANTEPARTUM (HCC): Primary | ICD-10-CM

## 2018-05-17 PROCEDURE — PNV: Performed by: OBSTETRICS & GYNECOLOGY

## 2018-05-17 PROCEDURE — 76815 OB US LIMITED FETUS(S): CPT | Performed by: OBSTETRICS & GYNECOLOGY

## 2018-05-17 PROCEDURE — 59025 FETAL NON-STRESS TEST: CPT | Performed by: OBSTETRICS & GYNECOLOGY

## 2018-05-17 NOTE — PROGRESS NOTES
Problem List Items Addressed This Visit        Other    Encounter for supervision of normal first pregnancy in third trimester - Primary     Patient a normal NST HAYDEN today  Set up for induction on May 22, 2018 for late term  Informed consent was obtained  Patient has no complaints

## 2018-05-17 NOTE — ASSESSMENT & PLAN NOTE
Patient a normal NST HAYDEN today  Set up for induction on May 22, 2018 for late term  Informed consent was obtained  Patient has no complaints

## 2018-05-17 NOTE — PROGRESS NOTES
NST procedure and expected outcome explained to patient  Daily fetal kick count reviewed  Patient verbalized understanding of all and was receptive      India Preciado RN

## 2018-05-22 ENCOUNTER — HOSPITAL ENCOUNTER (INPATIENT)
Facility: HOSPITAL | Age: 33
LOS: 3 days | Discharge: HOME | End: 2018-05-25
Attending: OBSTETRICS & GYNECOLOGY | Admitting: OBSTETRICS & GYNECOLOGY
Payer: COMMERCIAL

## 2018-05-22 DIAGNOSIS — Z3A.41 41 WEEKS GESTATION OF PREGNANCY: Primary | ICD-10-CM

## 2018-05-22 PROCEDURE — 81001 URINALYSIS AUTO W/SCOPE: CPT | Performed by: OBSTETRICS & GYNECOLOGY

## 2018-05-22 PROCEDURE — 82570 ASSAY OF URINE CREATININE: CPT | Performed by: OBSTETRICS & GYNECOLOGY

## 2018-05-22 PROCEDURE — 84156 ASSAY OF PROTEIN URINE: CPT | Performed by: OBSTETRICS & GYNECOLOGY

## 2018-05-22 PROCEDURE — 85025 COMPLETE CBC W/AUTO DIFF WBC: CPT | Performed by: OBSTETRICS & GYNECOLOGY

## 2018-05-22 PROCEDURE — 86592 SYPHILIS TEST NON-TREP QUAL: CPT | Performed by: OBSTETRICS & GYNECOLOGY

## 2018-05-22 PROCEDURE — 0KQM0ZZ REPAIR PERINEUM MUSCLE, OPEN APPROACH: ICD-10-PCS | Performed by: OBSTETRICS & GYNECOLOGY

## 2018-05-22 PROCEDURE — 80053 COMPREHEN METABOLIC PANEL: CPT | Performed by: OBSTETRICS & GYNECOLOGY

## 2018-05-22 RX ORDER — ONDANSETRON 2 MG/ML
4 INJECTION INTRAMUSCULAR; INTRAVENOUS EVERY 6 HOURS PRN
Status: DISCONTINUED | OUTPATIENT
Start: 2018-05-22 | End: 2018-05-24

## 2018-05-22 RX ORDER — SODIUM CHLORIDE, SODIUM LACTATE, POTASSIUM CHLORIDE, CALCIUM CHLORIDE 600; 310; 30; 20 MG/100ML; MG/100ML; MG/100ML; MG/100ML
125 INJECTION, SOLUTION INTRAVENOUS CONTINUOUS
Status: DISCONTINUED | OUTPATIENT
Start: 2018-05-22 | End: 2018-05-24

## 2018-05-23 ENCOUNTER — ANESTHESIA (INPATIENT)
Dept: LABOR AND DELIVERY | Facility: HOSPITAL | Age: 33
End: 2018-05-23
Payer: COMMERCIAL

## 2018-05-23 ENCOUNTER — ANESTHESIA EVENT (INPATIENT)
Dept: LABOR AND DELIVERY | Facility: HOSPITAL | Age: 33
End: 2018-05-23
Payer: COMMERCIAL

## 2018-05-23 LAB
ABO GROUP BLD: NORMAL
ALBUMIN SERPL BCP-MCNC: 2.6 G/DL (ref 3.5–5)
ALP SERPL-CCNC: 115 U/L (ref 46–116)
ALT SERPL W P-5'-P-CCNC: 15 U/L (ref 12–78)
ANION GAP SERPL CALCULATED.3IONS-SCNC: 8 MMOL/L (ref 4–13)
AST SERPL W P-5'-P-CCNC: 14 U/L (ref 5–45)
BACTERIA UR QL AUTO: ABNORMAL /HPF
BASOPHILS # BLD AUTO: 0.02 THOUSANDS/ΜL (ref 0–0.1)
BASOPHILS NFR BLD AUTO: 0 % (ref 0–1)
BILIRUB SERPL-MCNC: 0.18 MG/DL (ref 0.2–1)
BILIRUB UR QL STRIP: NEGATIVE
BLD GP AB SCN SERPL QL: NEGATIVE
BUN SERPL-MCNC: 8 MG/DL (ref 5–25)
CALCIUM SERPL-MCNC: 9 MG/DL (ref 8.3–10.1)
CAOX CRY URNS QL MICRO: ABNORMAL /HPF
CHLORIDE SERPL-SCNC: 110 MMOL/L (ref 100–108)
CLARITY UR: ABNORMAL
CO2 SERPL-SCNC: 22 MMOL/L (ref 21–32)
COLOR UR: YELLOW
CREAT SERPL-MCNC: 0.73 MG/DL (ref 0.6–1.3)
CREAT UR-MCNC: 139 MG/DL
EOSINOPHIL # BLD AUTO: 0.15 THOUSAND/ΜL (ref 0–0.61)
EOSINOPHIL NFR BLD AUTO: 1 % (ref 0–6)
ERYTHROCYTE [DISTWIDTH] IN BLOOD BY AUTOMATED COUNT: 13.2 % (ref 11.6–15.1)
GFR SERPL CREATININE-BSD FRML MDRD: 111 ML/MIN/1.73SQ M
GLUCOSE SERPL-MCNC: 129 MG/DL (ref 65–140)
GLUCOSE UR STRIP-MCNC: ABNORMAL MG/DL
HCT VFR BLD AUTO: 36 % (ref 34.8–46.1)
HGB BLD-MCNC: 12.4 G/DL (ref 11.5–15.4)
HGB UR QL STRIP.AUTO: NEGATIVE
KETONES UR STRIP-MCNC: NEGATIVE MG/DL
LEUKOCYTE ESTERASE UR QL STRIP: NEGATIVE
LYMPHOCYTES # BLD AUTO: 3.44 THOUSANDS/ΜL (ref 0.6–4.47)
LYMPHOCYTES NFR BLD AUTO: 27 % (ref 14–44)
MCH RBC QN AUTO: 30.2 PG (ref 26.8–34.3)
MCHC RBC AUTO-ENTMCNC: 34.4 G/DL (ref 31.4–37.4)
MCV RBC AUTO: 88 FL (ref 82–98)
MONOCYTES # BLD AUTO: 1.14 THOUSAND/ΜL (ref 0.17–1.22)
MONOCYTES NFR BLD AUTO: 9 % (ref 4–12)
MUCOUS THREADS UR QL AUTO: ABNORMAL
NEUTROPHILS # BLD AUTO: 7.96 THOUSANDS/ΜL (ref 1.85–7.62)
NEUTS SEG NFR BLD AUTO: 62 % (ref 43–75)
NITRITE UR QL STRIP: NEGATIVE
NON-SQ EPI CELLS URNS QL MICRO: ABNORMAL /HPF
NRBC BLD AUTO-RTO: 0 /100 WBCS
PH UR STRIP.AUTO: 6.5 [PH] (ref 4.5–8)
PLATELET # BLD AUTO: 260 THOUSANDS/UL (ref 149–390)
PMV BLD AUTO: 11.3 FL (ref 8.9–12.7)
POTASSIUM SERPL-SCNC: 3.5 MMOL/L (ref 3.5–5.3)
PROT SERPL-MCNC: 6.2 G/DL (ref 6.4–8.2)
PROT UR STRIP-MCNC: NEGATIVE MG/DL
PROT UR-MCNC: 10 MG/DL
PROT/CREAT UR: 0.07 MG/G{CREAT} (ref 0–0.1)
RBC # BLD AUTO: 4.11 MILLION/UL (ref 3.81–5.12)
RBC #/AREA URNS AUTO: ABNORMAL /HPF
RH BLD: POSITIVE
RPR SER QL: NORMAL
SODIUM SERPL-SCNC: 140 MMOL/L (ref 136–145)
SP GR UR STRIP.AUTO: 1.02 (ref 1–1.03)
SPECIMEN EXPIRATION DATE: NORMAL
UROBILINOGEN UR QL STRIP.AUTO: 1 E.U./DL
WBC # BLD AUTO: 12.79 THOUSAND/UL (ref 4.31–10.16)
WBC #/AREA URNS AUTO: ABNORMAL /HPF

## 2018-05-23 PROCEDURE — 86901 BLOOD TYPING SEROLOGIC RH(D): CPT | Performed by: OBSTETRICS & GYNECOLOGY

## 2018-05-23 PROCEDURE — 86850 RBC ANTIBODY SCREEN: CPT | Performed by: OBSTETRICS & GYNECOLOGY

## 2018-05-23 PROCEDURE — 86900 BLOOD TYPING SEROLOGIC ABO: CPT | Performed by: OBSTETRICS & GYNECOLOGY

## 2018-05-23 RX ORDER — FENTANYL CITRATE 50 UG/ML
INJECTION, SOLUTION INTRAMUSCULAR; INTRAVENOUS
Status: COMPLETED
Start: 2018-05-23 | End: 2018-05-23

## 2018-05-23 RX ORDER — METOCLOPRAMIDE HYDROCHLORIDE 5 MG/ML
5 INJECTION INTRAMUSCULAR; INTRAVENOUS EVERY 6 HOURS PRN
Status: DISCONTINUED | OUTPATIENT
Start: 2018-05-23 | End: 2018-05-24

## 2018-05-23 RX ORDER — DIPHENHYDRAMINE HYDROCHLORIDE 50 MG/ML
25 INJECTION INTRAMUSCULAR; INTRAVENOUS EVERY 6 HOURS PRN
Status: DISCONTINUED | OUTPATIENT
Start: 2018-05-23 | End: 2018-05-24

## 2018-05-23 RX ORDER — OXYTOCIN/RINGER'S LACTATE 30/500 ML
2 PLASTIC BAG, INJECTION (ML) INTRAVENOUS CONTINUOUS
Status: DISCONTINUED | OUTPATIENT
Start: 2018-05-23 | End: 2018-05-24

## 2018-05-23 RX ORDER — BUTORPHANOL TARTRATE 1 MG/ML
1 INJECTION, SOLUTION INTRAMUSCULAR; INTRAVENOUS
Status: DISCONTINUED | OUTPATIENT
Start: 2018-05-23 | End: 2018-05-25 | Stop reason: HOSPADM

## 2018-05-23 RX ORDER — FENTANYL CITRATE 50 UG/ML
INJECTION, SOLUTION INTRAMUSCULAR; INTRAVENOUS AS NEEDED
Status: DISCONTINUED | OUTPATIENT
Start: 2018-05-23 | End: 2018-05-24 | Stop reason: SURG

## 2018-05-23 RX ORDER — BUPIVACAINE HYDROCHLORIDE 2.5 MG/ML
INJECTION, SOLUTION INFILTRATION; PERINEURAL AS NEEDED
Status: DISCONTINUED | OUTPATIENT
Start: 2018-05-23 | End: 2018-05-24 | Stop reason: SURG

## 2018-05-23 RX ORDER — PROMETHAZINE HYDROCHLORIDE 25 MG/ML
25 INJECTION, SOLUTION INTRAMUSCULAR; INTRAVENOUS EVERY 6 HOURS PRN
Status: DISCONTINUED | OUTPATIENT
Start: 2018-05-23 | End: 2018-05-24

## 2018-05-23 RX ORDER — ONDANSETRON 2 MG/ML
4 INJECTION INTRAMUSCULAR; INTRAVENOUS EVERY 4 HOURS PRN
Status: DISCONTINUED | OUTPATIENT
Start: 2018-05-23 | End: 2018-05-24

## 2018-05-23 RX ADMIN — ROPIVACAINE HYDROCHLORIDE: 2 INJECTION, SOLUTION EPIDURAL; INFILTRATION at 19:38

## 2018-05-23 RX ADMIN — PROMETHAZINE HYDROCHLORIDE 25 MG: 25 INJECTION INTRAMUSCULAR; INTRAVENOUS at 10:32

## 2018-05-23 RX ADMIN — SODIUM CHLORIDE, SODIUM LACTATE, POTASSIUM CHLORIDE, AND CALCIUM CHLORIDE 125 ML/HR: .6; .31; .03; .02 INJECTION, SOLUTION INTRAVENOUS at 20:26

## 2018-05-23 RX ADMIN — BUPIVACAINE HYDROCHLORIDE 1 ML: 2.5 INJECTION, SOLUTION INFILTRATION; PERINEURAL at 13:02

## 2018-05-23 RX ADMIN — SODIUM CHLORIDE, SODIUM LACTATE, POTASSIUM CHLORIDE, AND CALCIUM CHLORIDE 125 ML/HR: .6; .31; .03; .02 INJECTION, SOLUTION INTRAVENOUS at 12:35

## 2018-05-23 RX ADMIN — SODIUM CHLORIDE, SODIUM LACTATE, POTASSIUM CHLORIDE, AND CALCIUM CHLORIDE 125 ML/HR: .6; .31; .03; .02 INJECTION, SOLUTION INTRAVENOUS at 16:52

## 2018-05-23 RX ADMIN — Medication 2 MILLI-UNITS/MIN: at 09:00

## 2018-05-23 RX ADMIN — ROPIVACAINE HYDROCHLORIDE: 2 INJECTION, SOLUTION EPIDURAL; INFILTRATION at 13:10

## 2018-05-23 RX ADMIN — ROPIVACAINE HYDROCHLORIDE: 2 INJECTION, SOLUTION EPIDURAL; INFILTRATION at 20:06

## 2018-05-23 RX ADMIN — BUTORPHANOL TARTRATE 1 MG: 1 INJECTION, SOLUTION INTRAMUSCULAR; INTRAVENOUS at 10:31

## 2018-05-23 RX ADMIN — SODIUM CHLORIDE, SODIUM LACTATE, POTASSIUM CHLORIDE, AND CALCIUM CHLORIDE 1000 ML: .6; .31; .03; .02 INJECTION, SOLUTION INTRAVENOUS at 04:30

## 2018-05-23 RX ADMIN — MISOPROSTOL 25 MCG: 100 TABLET ORAL at 00:14

## 2018-05-23 RX ADMIN — SODIUM CHLORIDE, SODIUM LACTATE, POTASSIUM CHLORIDE, AND CALCIUM CHLORIDE 125 ML/HR: .6; .31; .03; .02 INJECTION, SOLUTION INTRAVENOUS at 05:32

## 2018-05-23 RX ADMIN — FENTANYL CITRATE 10 MCG: 50 INJECTION, SOLUTION INTRAMUSCULAR; INTRAVENOUS at 13:02

## 2018-05-23 NOTE — OB LABOR/OXYTOCIN SAFETY PROGRESS
Oxytocin Safety Progress Check Note - Mendel Margarita 29 y o  female MRN: [id number]    Unit/Bed#: -01 Encounter: [id number]    Obstetric History       T0      L0     SAB0   TAB0   Ectopic0   Multiple0   Live Births0      Gestational Age: 41w1d  Dose (shania-units/min) Oxytocin: 2 shania-units/min  Contraction Frequency (minutes): unable to  ctxn  Contraction Quality: Moderate  Tachysystole: No   Dilation: 2        Effacement (%): 90  Station: -2  Baseline Rate: 145 bpm  Fetal Heart Rate: 147 BPM  FHR Category: Category II          Notes/comments:   FHT started having recurrent variables after IUPC was placed  Will lower the pitocin down to 2  Then will restart pitocin at half dose after 30 minutes of category 1 strip       D/W Dr Jordan Arenas MD 2018 4:13 PM

## 2018-05-23 NOTE — H&P
History  & Physical - OB/GYN   Maryann Nelson 29 y o  (1985) - MRN: [id number]  Unit/Bed#: LD Triage 3-01 Encounter: [id number]    Assessment:   29 y o   at 41w0d weeks  Plan:   CBC, RPR, T&S, Epidural on request  Elevated BPs in triage: send Pre-eclamptic labs  Cytotec placed in posterior fornix at 00:14 on 18      Patient of [address] (Dr Marian Klinefelter)    29 y o  G1 at 41w0d weeks  Chief complaint:  "I am here for my IOL"    SUBJECTIVE:  HPI:  Vaginal bleeding:   no  Leaking of fluid:  no  Contractions:  yes  Fetal movement:  yes     Denies H/A, Visual changes consistent with retinal vasospasm, RUQ/epigastric pain, N&V, seizures      OBJECTIVE:    Patient Active Problem List   Diagnosis    Generalized anxiety disorder    Recurrent depressive disorder, current episode mild ([address] )    OCD (obsessive compulsive disorder)    Encounter for supervision of normal first pregnancy in third trimester    Maternal morbid obesity in third trimester, antepartum ([address] )    Cephalic version    Change in vision    41 weeks gestation of pregnancy       OB History    Para Term  AB Living   1             SAB TAB Ectopic Multiple Live Births                  # Outcome Date GA Lbr Gerson/2nd Weight Sex Delivery Anes PTL Lv   1 Current                   Past Medical History:   Diagnosis Date    Anxiety     Migraine     Varicella     childhood    Varicose veins of both lower extremities        Past Surgical History:   Procedure Laterality Date    NO PAST SURGERIES         No current facility-administered medications on file prior to encounter        Current Outpatient Prescriptions on File Prior to Encounter   Medication Sig Dispense Refill    calcium carbonate (TUMS EX) 750 mg chewable tablet Chew 1 tablet daily      cetirizine (ZyrTEC) 10 mg tablet Take 10 mg by mouth daily      Prenatal MV & Min w/FA-DHA (PRENATAL ADULT GUMMY/DHA/FA PO) Take by mouth         No Known Allergies    Social History Social History    Marital status: Single     Spouse name: N/A    Number of children: N/A    Years of education: N/A     Occupational History    Not on file  Social History Main Topics    Smoking status: Never Smoker    Smokeless tobacco: Never Used    Alcohol use No    Drug use: No    Sexual activity: Yes     Partners: Male     Other Topics Concern    Not on file     Social History Narrative    Uses seat belt    Daily coffee consumption       Labs:     Infectious:  Strep Grp B PCR   Date Value Ref Range Status   04/19/2018 Negative for Beta Hemolytic Strep Grp B by PCR  Final     RPR   Date Value Ref Range Status   02/17/2018 NON-REACTIVE NON-REACTIVE Final   11/06/2017 Non-Reactive Non-Reactive Final     Hepatitis B Surface Ag   Date Value Ref Range Status   11/06/2017 Non-reactive Non-reactive, NonReactive - Confirmed Final     Rubella IgG Quant   Date Value Ref Range Status   11/06/2017 55 4 >9 9 IU/mL Final     HIV-1/HIV-2 Ab   Date Value Ref Range Status   11/06/2017 Non-Reactive Non-Reactive Final     N gonorrhoeae, DNA Probe   Date Value Ref Range Status   11/10/2017 N  gonorrhoeae Amplified DNA Negative N  gonorrhoeae Amplified DNA Negative Final     Chlamydia, DNA Probe   Date Value Ref Range Status   11/10/2017 C  trachomatis Amplified DNA Negative C  trachomatis Amplified DNA Negative Final       Type & Screen:  ABO Grouping   Date Value Ref Range Status   11/06/2017 O  Final     Rh Factor   Date Value Ref Range Status   11/06/2017 Positive  Final     Antibody Screen   Date Value Ref Range Status   11/06/2017 Negative  Final     No results found for: SPECIMEXP    Endocrine:  50 gram 1 hour Glucola:   Glucose   Date Value Ref Range Status   02/17/2018 111 <135 mg/dL Final         Vaccines:  Immunization History   Administered Date(s) Administered    Influenza 12/28/2012, 01/16/2014, 11/06/2014, 12/22/2015, 11/10/2017    Influenza Quadrivalent Preservative Free 3 years and older IM 11/10/2017    Meningococcal MCV4P 08/07/2008    Td (adult), Unspecified 08/07/2008    Tdap 04/19/2018       Physical Exam:  Vital signs:  Vitals:    05/22/18 2205 05/22/18 2232   BP: 147/78 140/83   BP Location:  Right arm   Pulse: 103 98   Resp: 18 18   Temp: 98 7 °F (37 1 °C) 98 8 °F (37 1 °C)   TempSrc: Tympanic Oral   SpO2: 97%    Weight:  (!) 142 kg (313 lb)   Height:  5' 11" (1 803 m)     Heart: RRR  Lungs: CTA b/l  Abdomen: gravid  Presentation: vtx by u/s  Estimated Fetal Weight: 7#8 lbs  Cervix: closed / -2  NST: reactive  CTXN: irregular  Membranes: intact

## 2018-05-23 NOTE — OB LABOR/OXYTOCIN SAFETY PROGRESS
Oxytocin Safety Progress Check Note - Aditi Ortizer 29 y o  female MRN: [id number]    Unit/Bed#: -01 Encounter: [id number]    Obstetric History       T0      L0     SAB0   TAB0   Ectopic0   Multiple0   Live Births0      Gestational Age: 41w1d  Dose (shania-units/min) Oxytocin: 8 shania-units/min  Contraction Frequency (minutes): 2-3  Contraction Quality: Mild  Tachysystole: No   Dilation: 2        Effacement (%): 90  Station: -2  Baseline Rate: 150 bpm  Fetal Heart Rate: 147 BPM  FHR Category: Category II          Notes/comments:   Patient is resting comfortably with epidural   FHT has been category 2 with intermittent variables that self-resolve  She has made some cervical change  She was AROMed for thin meconium  Will recheck in two hours or earlier as needed    D/w Dr Josee Cam MD 2018 2:43 PM

## 2018-05-23 NOTE — ANESTHESIA PROCEDURE NOTES
CSE Block    Patient location during procedure: OB  Start time: 5/23/2018 1:02 PM  Reason for block: procedure for pain and at surgeon's request  Staffing  Anesthesiologist: Jose Solorzano  Performed: anesthesiologist   Preanesthetic Checklist  Completed: patient identified, site marked, surgical consent, pre-op evaluation, timeout performed, IV checked, risks and benefits discussed and monitors and equipment checked  CSE  Patient position: sitting  Prep: ChloraPrep  Patient monitoring: cardiac monitor and continuous pulse ox  Approach: midline  Spinal Needle  Needle type: pencil-tip   Needle gauge: 27 G  Needle length: 10 cm  Epidural Needle  Injection technique: TRI saline  Needle type: Tuohy   Needle gauge: 18 G  Location: lumbar (1-5)  Catheter  Catheter type: side hole  Catheter size: 20 G  Catheter at skin depth: 13 cm  Test dose: negative  Assessment  Injection Assessment:  negative aspiration for heme, no paresthesia on injection, positive aspiration for clear CSF and no pain on injection

## 2018-05-23 NOTE — OB LABOR/OXYTOCIN SAFETY PROGRESS
Labor Progress Note - Mitzi Waldron 29 y o  female MRN: [id number]    Unit/Bed#:  Triage 3-01 Encounter: [id number]    Obstetric History       T0      L0     SAB0   TAB0   Ectopic0   Multiple0   Live Births0      Gestational Age: 41w1d     Contraction Frequency (minutes): 1 5-3  Contraction Quality: Mild, Moderate  Tachysystole: No   Dilation: Closed        Effacement (%): 0  Station: -3  Baseline Rate: 140 bpm  Fetal Heart Rate: 140 BPM  FHR Category: Category I          Notes/comments:   Patient now reports increased pain with contractions at this time, candice every 2 minutes therefore not a candidate for subsequent Cytotec at this time   Will initiate LD pitocin and allow light breakfast           Jasiel Richards MD 2018 7:43 AM

## 2018-05-23 NOTE — ANESTHESIA PREPROCEDURE EVALUATION
Review of Systems/Medical History  Patient summary reviewed  Chart reviewed  No history of anesthetic complications     Cardiovascular  Negative cardio ROS    Pulmonary  Negative pulmonary ROS        GI/Hepatic  Negative GI/hepatic ROS          Negative  ROS        Endo/Other    Obesity  morbid obesity   GYN  Currently pregnant , Prior pregnancy/OB history : 1 Parity: 0,          Hematology  Negative hematology ROS      Musculoskeletal  Negative musculoskeletal ROS        Neurology    Headaches,   Comment: Migraines Psychology   Psychiatric history, Anxiety, Depression ,     Comment: OCD         Physical Exam    Airway    Mallampati score: II  TM Distance: >3 FB  Neck ROM: full     Dental   No notable dental hx     Cardiovascular  Comment: Negative ROS, Rhythm: regular, Rate: normal, Cardiovascular exam normal    Pulmonary  Pulmonary exam normal Breath sounds clear to auscultation,     Other Findings        Anesthesia Plan  ASA Score- 3     Anesthesia Type- epidural and spinal with ASA Monitors  Additional Monitors:   Airway Plan:         Plan Factors-    Induction-     Postoperative Plan-     Informed Consent- Anesthetic plan and risks discussed with patient  Recent labs personally reviewed:  Lab Results   Component Value Date    WBC 12 79 (H) 2018    HGB 12 4 2018     2018     Lab Results   Component Value Date     2018    K 3 5 2018    BUN 8 2018    CREATININE 0 73 2018    GLUCOSE 129 2018     No results found for: PTT   No results found for: INR    Blood type O    No results found for: Sheila Seth MD, have personally seen and evaluated the patient prior to anesthetic care  I have reviewed the pre-anesthetic record, and other medical records if appropriate to the anesthetic care  If a CRNA is involved in the case, I have reviewed the CRNA assessment, if present, and agree   Risks/benefits and alternatives discussed with patient including possible PONV, sore throat, and possibility of rare anesthetic and surgical emergencies

## 2018-05-23 NOTE — OB LABOR/OXYTOCIN SAFETY PROGRESS
Oxytocin Safety Progress Check Note - Aditi Ortizer 29 y o  female MRN: [id number]    Unit/Bed#: -01 Encounter: [id number]    Obstetric History       T0      L0     SAB0   TAB0   Ectopic0   Multiple0   Live Births0        Gestational Age: 41w1d  Dose (shania-units/min) Oxytocin: 2 shania-units/min  Contraction Frequency (minutes): 2-3  Contraction Quality: Moderate  Tachysystole: No   Dilation: 4        Effacement (%): 80  Station: -2  Baseline Rate: 145 bpm  Fetal Heart Rate: 147 BPM  FHR Category: Category I       Notes/comments:   Patient is currently comfortable at this time with epidural, will restart Pitocin at 2  Making cervical change on exam   Continue to monitor            Tierra Lua MD 2018 6:08 PM

## 2018-05-23 NOTE — OB LABOR/OXYTOCIN SAFETY PROGRESS
Oxytocin Safety Progress Check Note - Maryann Nelson 29 y o  female MRN: [id number]    Unit/Bed#: -01 Encounter: [id number]    Obstetric History       T0      L0     SAB0   TAB0   Ectopic0   Multiple0   Live Births0      Gestational Age: 41w1d  Dose (shania-units/min) Oxytocin: 0 shania-units/min  Contraction Frequency (minutes): 1 5-4  Contraction Quality: Moderate  Tachysystole: No   Dilation: 2        Effacement (%): 90  Station: -2  Baseline Rate: 145 bpm  Fetal Heart Rate: 147 BPM  FHR Category: Category II          Notes/comments:   WIth pitocin at 2 milliunits MVU over 250  Will stop pitocin at this time  Will re evaluate in 2 hours  IF contractions decrease will consider restart of pitocin  This plan of care was discussed with Dominga Crouch (Labor nurse) and residents on duty                Kate Mattson MD 2018 5:04 PM

## 2018-05-23 NOTE — OB LABOR/OXYTOCIN SAFETY PROGRESS
Oxytocin Safety Progress Check Note - Emma Phillips 29 y o  female MRN: [id number]    Unit/Bed#: LD Triage - Encounter: [id number]    Obstetric History       T0      L0     SAB0   TAB0   Ectopic0   Multiple0   Live Births0      Gestational Age: 41w1d  Dose (shania-units/min) Oxytocin: 2 shania-units/min  Contraction Frequency (minutes): 2  Contraction Quality: Mild  Tachysystole: No   Dilation: 1        Effacement (%): 70  Station: -2  Baseline Rate: 130 bpm  Fetal Heart Rate: 150 BPM  FHR Category: Category I          Notes/comments:      Plan to titrate pitocin      Stadol/Phenergan for pain       Cruz Soto MD 2018 9:53 AM

## 2018-05-23 NOTE — PLAN OF CARE
BIRTH - VAGINAL/ SECTION     Fetal and maternal status remain reassuring during the birth process [de-identified]     Emotionally satisfying birthing experience for mother/fetus [address] Discharge to home or other facility with appropriate resources Progressing        INFECTION - ADULT     Absence or prevention of progression during hospitalization Progressing        Knowledge Deficit     Verbalizes understanding of labor plan Progressing     Patient/family/caregiver demonstrates understanding of disease process, treatment plan, medications, and discharge instructions Progressing        Labor & Delivery     Manages discomfort Progressing     Patient vital signs are stable Progressing        PAIN - ADULT     Verbalizes/displays adequate comfort level or baseline comfort level Progressing        SAFETY ADULT     Patient will remain free of falls Progressing     Maintain or return to baseline ADL function Progressing     Maintain or return mobility status to optimal level Progressing

## 2018-05-24 LAB
BASE EXCESS BLDCOA CALC-SCNC: -10.3 MMOL/L (ref 3–11)
BASE EXCESS BLDCOV CALC-SCNC: -8.6 MMOL/L (ref 1–9)
HCO3 BLDCOA-SCNC: 19.2 MMOL/L (ref 17.3–27.3)
HCO3 BLDCOV-SCNC: 17.8 MMOL/L (ref 12.2–28.6)
O2 CT VFR BLDCOA CALC: 8 ML/DL
OXYHGB MFR BLDCOA: 36.1 %
OXYHGB MFR BLDCOV: 49.9 %
PCO2 BLDCOA: 56.6 MM[HG] (ref 30–60)
PCO2 BLDCOV: 40 MM HG (ref 27–43)
PH BLDCOA: 7.15 [PH] (ref 7.23–7.43)
PH BLDCOV: 7.27 [PH] (ref 7.19–7.49)
PO2 BLDCOA: 24 MM HG (ref 5–25)
PO2 BLDCOV: 25.6 MM HG (ref 15–45)
SAO2 % BLDCOV: 10.9 ML/DL

## 2018-05-24 PROCEDURE — 82805 BLOOD GASES W/O2 SATURATION: CPT | Performed by: OBSTETRICS & GYNECOLOGY

## 2018-05-24 PROCEDURE — 59400 OBSTETRICAL CARE: CPT | Performed by: OBSTETRICS & GYNECOLOGY

## 2018-05-24 RX ORDER — TRISODIUM CITRATE DIHYDRATE AND CITRIC ACID MONOHYDRATE 500; 334 MG/5ML; MG/5ML
30 SOLUTION ORAL 4 TIMES DAILY PRN
Status: DISCONTINUED | OUTPATIENT
Start: 2018-05-24 | End: 2018-05-25 | Stop reason: HOSPADM

## 2018-05-24 RX ORDER — BISACODYL 10 MG
10 SUPPOSITORY, RECTAL RECTAL DAILY PRN
Status: DISCONTINUED | OUTPATIENT
Start: 2018-05-24 | End: 2018-05-25 | Stop reason: HOSPADM

## 2018-05-24 RX ORDER — DIPHENHYDRAMINE HCL 25 MG
25 TABLET ORAL EVERY 6 HOURS PRN
Status: DISCONTINUED | OUTPATIENT
Start: 2018-05-24 | End: 2018-05-25 | Stop reason: HOSPADM

## 2018-05-24 RX ORDER — ONDANSETRON 2 MG/ML
4 INJECTION INTRAMUSCULAR; INTRAVENOUS EVERY 8 HOURS PRN
Status: DISCONTINUED | OUTPATIENT
Start: 2018-05-24 | End: 2018-05-25 | Stop reason: HOSPADM

## 2018-05-24 RX ORDER — SIMETHICONE 80 MG
80 TABLET,CHEWABLE ORAL 4 TIMES DAILY PRN
Status: DISCONTINUED | OUTPATIENT
Start: 2018-05-24 | End: 2018-05-25 | Stop reason: HOSPADM

## 2018-05-24 RX ORDER — DOCUSATE SODIUM 100 MG/1
100 CAPSULE, LIQUID FILLED ORAL 2 TIMES DAILY PRN
Status: DISCONTINUED | OUTPATIENT
Start: 2018-05-24 | End: 2018-05-25 | Stop reason: HOSPADM

## 2018-05-24 RX ORDER — OXYCODONE HYDROCHLORIDE AND ACETAMINOPHEN 5; 325 MG/1; MG/1
1 TABLET ORAL EVERY 4 HOURS PRN
Status: DISCONTINUED | OUTPATIENT
Start: 2018-05-24 | End: 2018-05-25 | Stop reason: HOSPADM

## 2018-05-24 RX ORDER — IBUPROFEN 600 MG/1
600 TABLET ORAL EVERY 6 HOURS PRN
Status: DISCONTINUED | OUTPATIENT
Start: 2018-05-24 | End: 2018-05-25 | Stop reason: HOSPADM

## 2018-05-24 RX ORDER — KETOROLAC TROMETHAMINE 30 MG/ML
15 INJECTION, SOLUTION INTRAMUSCULAR; INTRAVENOUS ONCE
Status: COMPLETED | OUTPATIENT
Start: 2018-05-24 | End: 2018-05-24

## 2018-05-24 RX ORDER — SENNOSIDES 8.6 MG
1 TABLET ORAL
Status: DISCONTINUED | OUTPATIENT
Start: 2018-05-24 | End: 2018-05-25 | Stop reason: HOSPADM

## 2018-05-24 RX ORDER — ACETAMINOPHEN 325 MG/1
650 TABLET ORAL EVERY 6 HOURS PRN
Status: DISCONTINUED | OUTPATIENT
Start: 2018-05-24 | End: 2018-05-25 | Stop reason: HOSPADM

## 2018-05-24 RX ORDER — DIAPER,BRIEF,INFANT-TODD,DISP
1 EACH MISCELLANEOUS AS NEEDED
Status: DISCONTINUED | OUTPATIENT
Start: 2018-05-24 | End: 2018-05-25 | Stop reason: HOSPADM

## 2018-05-24 RX ORDER — OXYCODONE HYDROCHLORIDE AND ACETAMINOPHEN 5; 325 MG/1; MG/1
2 TABLET ORAL EVERY 4 HOURS PRN
Status: DISCONTINUED | OUTPATIENT
Start: 2018-05-24 | End: 2018-05-25 | Stop reason: HOSPADM

## 2018-05-24 RX ORDER — CALCIUM CARBONATE 200(500)MG
1000 TABLET,CHEWABLE ORAL DAILY PRN
Status: DISCONTINUED | OUTPATIENT
Start: 2018-05-24 | End: 2018-05-25 | Stop reason: HOSPADM

## 2018-05-24 RX ORDER — MAGNESIUM HYDROXIDE/ALUMINUM HYDROXICE/SIMETHICONE 120; 1200; 1200 MG/30ML; MG/30ML; MG/30ML
15 SUSPENSION ORAL EVERY 6 HOURS PRN
Status: DISCONTINUED | OUTPATIENT
Start: 2018-05-24 | End: 2018-05-25 | Stop reason: HOSPADM

## 2018-05-24 RX ADMIN — DOCUSATE SODIUM 100 MG: 100 CAPSULE, LIQUID FILLED ORAL at 19:47

## 2018-05-24 RX ADMIN — IBUPROFEN 600 MG: 600 TABLET ORAL at 22:16

## 2018-05-24 RX ADMIN — OXYCODONE HYDROCHLORIDE AND ACETAMINOPHEN 2 TABLET: 5; 325 TABLET ORAL at 03:18

## 2018-05-24 RX ADMIN — BENZOCAINE AND LEVOMENTHOL: 200; 5 SPRAY TOPICAL at 03:14

## 2018-05-24 RX ADMIN — KETOROLAC TROMETHAMINE 15 MG: 30 INJECTION, SOLUTION INTRAMUSCULAR at 03:11

## 2018-05-24 RX ADMIN — Medication 1 TABLET: at 08:48

## 2018-05-24 RX ADMIN — IBUPROFEN 600 MG: 600 TABLET ORAL at 08:49

## 2018-05-24 RX ADMIN — HYDROCORTISONE 1 APPLICATION: 1 CREAM TOPICAL at 03:13

## 2018-05-24 RX ADMIN — WITCH HAZEL 1 PAD: 500 SOLUTION RECTAL; TOPICAL at 03:13

## 2018-05-24 RX ADMIN — IBUPROFEN 600 MG: 600 TABLET ORAL at 15:46

## 2018-05-24 NOTE — OB LABOR/OXYTOCIN SAFETY PROGRESS
Oxytocin Safety Progress Check Note - Tanya Barry 29 y o  female MRN: [id number]    Unit/Bed#: -01 Encounter: [id number]    Obstetric History       T0      L0     SAB0   TAB0   Ectopic0   Multiple0   Live Births0      Gestational Age: 41w1d  Dose (shania-units/min) Oxytocin: 6 shania-units/min  Contraction Frequency (minutes): 1 5-5 5  Contraction Quality: Moderate  Tachysystole: No   Dilation: 6        Effacement (%): 80  Station: -2  Baseline Rate: 145 bpm  Fetal Heart Rate: 150 BPM  FHR Category: Category II          Notes/comments:   Patient continues to make cervical change at this time  Will continue to monitor at this time            Sari Gomez MD 2018 8:31 PM

## 2018-05-24 NOTE — DISCHARGE SUMMARY
Discharge Summary - Misti Dunaway 29 y o  female MRN: [id number]    Unit/Bed#: -01 Encounter: [id number]    Admission Date: 2018     Discharge Date: 18    Admitting Diagnosis: 41 week gestation, morbid obesity     Discharge Diagnosis: same    Procedures: spontaneous vaginal delivery    Attending: Dr Delonte Armendariz (admission), Dr Ivon Dumont (Delivery), Dr Yesenia Leon (discharge)    Hospital Course:     Misti Dunaway is a 29 y o   who was admitted at 41 wks for IOL LT  She underwent an uncomplicated spontaneous vaginal delivery   was transferred to  nursery  Patient tolerated the procedure well and was transferred to recovery in stable condition  On day of discharge, she was ambulating and able to reasonably perform all ADLs  She was voiding and had appropriate bowel function  Pain was well controlled  She was discharged home on postpartum day #1 without complications  Patient was instructed to follow up with her OB as an outpatient and was given appropriate warnings to call provider if she develops signs of infection or uncontrolled pain  Complications: None    Condition at discharge: good     Discharge instructions/Information to patient and family:   See after visit summary for information provided to patient and family  Provisions for Follow-Up Care:  See after visit summary for information related to follow-up care and any pertinent home health orders  Disposition: Home    Planned Readmission: No    Discharge Medications: For a complete list of the patient's medications, please refer to her med rec

## 2018-05-24 NOTE — LACTATION NOTE
This note was copied from a baby's chart  C/O sore nipples  Information given about sore nipples and how to correct with positioning techniques  Discussed maneuvers to latch infant on properly to avoid nipple pain and promote healing  Discussed treatments that could be utilized to promote healing  [facility]coraged MOB and FOB to call for assistance with positioning, deep latch,  Or questions and concerns  Extension number for inpatient lactation support provided

## 2018-05-24 NOTE — LACTATION NOTE
This note was copied from a baby's chart  CONSULT - LACTATION  Baby Girl  Anthony Staton) Leonard 0 days female MRN: [id number]    [facility] Room / Bed: (N)/ 307(N) Encounter: [id number]    Maternal Information     MOTHER:  Jessa Valle  Maternal Age: 29 y o    OB History: #: 1, Date: 18, Sex: Female, Weight: 3880 g (8 lb 8 9 oz), GA: 41w2d, Delivery: Vaginal, Spontaneous Delivery, Apgar1: 8, Apgar5: 9, Living: Living, Birth Comments: None   Previouse breast reduction surgery? No    Lactation history:   Has patient previously breast fed: No   How long had patient previously breast fed:     Previous breast feeding complications:       Past Surgical History:   Procedure Laterality Date    NO PAST SURGERIES         Birth information:  YOB: 2016   Time of birth: 12:48 AM   Sex: female   Delivery type: Vaginal, Spontaneous Delivery   Birth Weight: 3880 g (8 lb 8 9 oz)   Percent of Weight Change: 0%     Gestational Age: 38w3d   [unfilled]    Assessment     Breast and nipple assessment: cracked nipples     Assessment: normal assessment    Feeding assessment: latch difficulty (shallow latch with out asisstance)  LATCH:  Latch: Grasps breast, tongue down, lips flanged, rhythmic sucking   Audible Swallowing: Spontaneous and intermittent (24 hours old)   Type of Nipple: Everted (After stimulation)   Comfort (Breast/Nipple): Filling, red/small blisters/bruises, mild/moderate discomfort   Hold (Positioning): Full assist, teach one side, mother does other, staff holds   [facility] Score: 8          Feeding recommendations:  breast feed on demand     Met with mother and father Provided mother with Ready, Set, Baby booklet  Discussed Skin to Skin contact an benefits to mom and baby  Talked about the delay of the first bath until baby has adjusted  Spoke about the benefits of rooming in   Feeding on cue and what that means for recognizing infant's hunger  Avoidance of pacifiers for the first month discussed  Talked about exclusive breastfeeding for the first 6 months  Positioning and latch reviewed as well as showing images of other feeding positions  Discussed the properties of a good latch in any position  Reviewed hand/manual expression  Discussed s/s that baby is getting enough milk and some s/s that breastfeeding dyad may need further help  Gave information on common concerns, what to expect the first few weeks after delivery, preparing for other caregivers, and how partners can help  Resources for support also provided  Spent time working on different positions that would facilitate better transfer of breastmilk  Hand expression and sandwiching breast for deep latch and good suck on right breast then left using football hold  [facility]coraged MOB and FOB to call for assistance, questions and concerns  Extension number for inpatient lactation support provided          Albion Genera, RN 5/24/2018 3:31 PM

## 2018-05-24 NOTE — DISCHARGE INSTRUCTIONS
Vaginal Delivery   WHAT YOU SHOULD KNOW:   A vaginal delivery is the birth of your baby through your vagina (birth canal)  AFTER YOU LEAVE:   Medicines:  · NSAIDs  help decrease swelling and pain or fever  This medicine is available with or without a doctor's order  NSAIDs can cause stomach bleeding or kidney problems in certain people  If you take blood thinner medicine, always ask your healthcare provider if NSAIDs are safe for you  Always read the medicine label and follow directions  · Take your medicine as directed  Call your healthcare provider if you think your medicine is not helping or if you have side effects  Tell him if you are allergic to any medicine  Keep a list of the medicines, vitamins, and herbs you take  Include the amounts, and when and why you take them  Bring the list or the pill bottles to follow-up visits  Carry your medicine list with you in case of an emergency  Follow up with your primary healthcare provider:  Most women need to return 6 weeks after a vaginal delivery  Ask about how to care for your wounds or stitches  Write down your questions so you remember to ask them during your visits  Activity:  Rest as much as possible  Try to keep all activities short  You may be able to do some exercise soon after you have your baby  Talk with your primary healthcare provider before you start exercising  If you work outside the home, ask when you can return to your job  Kegel exercises:  Kegel exercises may help your vaginal and rectal muscles heal faster  You can do Kegel exercises by tightening and relaxing the muscles around your vagina  Kegel exercises help make the muscles stronger  Breast care:  When your milk comes in, your breasts may feel full and hard  Ask how to care for your breasts, even if you are not breastfeeding  Constipation:  Do not try to push the bowel movement out if it is too hard   High-fiber foods, extra liquids, and regular exercise can help you prevent constipation  Examples of high-fiber foods are fruit and bran  Prune juice and water are good liquids to drink  Regular exercise helps your digestive system work  You may also be told to take over-the-counter fiber and stool softener medicines  Take these items as directed  Hemorrhoids:  Pregnancy can cause severe hemorrhoids  You may have rectal pain because of the hemorrhoids  Ask how to prevent or treat hemorrhoids  Perineum care: Your perineum is the area between your vagina and anus  Keep the area clean and dry to help it heal and to prevent infection  Wash the area gently with soap and water when you bathe or shower  Rinse your perineum with warm water when you use the toilet  Your primary healthcare provider may suggest you use a warm sitz bath to help decrease pain  A sitz bath is a bathtub or basin filled to hip level  Stay in the sitz bath for 20 to 30 minutes, or as directed  Vaginal discharge: You will have vaginal discharge, called lochia, after your delivery  The lochia is bright red the first day or two after the birth  By the fourth day, the amount decreases, and it turns red-brown  Use a sanitary pad rather than a tampon to prevent a vaginal infection  It is normal to have lochia up to 8 weeks after your baby is born  Monthly periods: Your period may start again within 7 to 12 weeks after your baby is born  If you are breastfeeding, it may take longer for your period to start again  You can still get pregnant again even though you do not have your monthly period  Talk with your primary healthcare provider about a birth control method that will be good for you if you do not want to get pregnant  Mood changes: Many new mothers have some kind of mood changes after delivery  Some of these changes occur because of lack of sleep, hormone changes, and caring for a new baby  Some mood changes can be more serious, such as postpartum depression   Talk with your primary healthcare provider if you feel unable to care for yourself or your baby  Sexual activity:  You may need to avoid sex for 6 to 7 weeks after you have your baby  You may notice you have a decreased desire for sex, or sex may be painful  You may need to use a vaginal lubricant (gel) to help make sex more comfortable  Contact your primary healthcare provider if:   · You have heavy vaginal bleeding that fills 1 or more sanitary pads in 1 hour  · You have a fever  · Your pain does not go away, or gets worse  · The skin between your vagina and rectum is swollen, warm, or red  · You have swollen, hard, or painful breasts  · You feel very sad or depressed  · You feel more tired than usual      · You have questions or concerns about your condition or care  Seek care immediately or call 911 if:   · You have pus or yellow drainage coming from your vagina or wound  · You are urinating very little, or not at all  · Your arm or leg feels warm, tender, and painful  It may look swollen and red  · You feel lightheaded, have sudden and worsening chest pain, or trouble breathing  You may have more pain when you take deep breaths or cough, or you may cough up blood  © 2014 [address] is for End User's use only and may not be sold, redistributed or otherwise used for commercial purposes  All illustrations and images included in CareNotes® are the copyrighted property of [employer]  or [employer]  The above information is an  only  It is not intended as medical advice for individual conditions or treatments  Talk to your doctor, nurse or pharmacist before following any medical regimen to see if it is safe and effective for you

## 2018-05-24 NOTE — OB LABOR/OXYTOCIN SAFETY PROGRESS
Oxytocin Safety Progress Check Note - Ciera Eckert 29 y o  female MRN: [id number]    Unit/Bed#: -01 Encounter: [id number]    Obstetric History       T0      L0     SAB0   TAB0   Ectopic0   Multiple0   Live Births0      Gestational Age: 41w1d    Contraction Frequency (mins): 3  Dilation (cm): 9  Effacement (%): 100   Station: +1     Rate Oxytocin (millunits/hour):  Dose (shania-units/min) Oxytocin: 8 shania-units/min    Baseline Rate (bpm): 2  FHR Tracing: Category 140    ~~~ FHR tracing:    reviewed;    in the trailing 30 mins:     moderate variability present    accelerations to 15 bpm x 15 secs present    no decelerations present;    Single 7 minute prolonged deceleration that resolved with appropriate interventions  ~~~ FHR interventions:    Maternal position change   Increased IV fluids   Facemask Oxygen therapy   Vital signs taken   SVE   FSE applied    ~~~ Labor course: Patients labor progressing  ; cervical dilation continuing      ~~~ Pain management: patient is comfortable and does not request further intervention at this time     ~~~ GBS prophylaxis:   Strep Grp B PCR   Date Value Ref Range Status   2018 Negative for Beta Hemolytic Strep Grp B by PCR  Final             Vitals:    18 2259 18 2304 18 2313 18 2328   BP: 111/55  137/64 138/69   BP Location:       Pulse: 77  94 92   Resp:       Temp:  98 4 °F (36 9 °C)     TempSrc:  Oral     SpO2:       Weight:       Height:             Plan: will continue to monitor labor progress, FHR tracing, external tocometry, and blood pressures           Rashmi Damian

## 2018-05-24 NOTE — L&D DELIVERY NOTE
DELIVERY NOTE  Emma Phillips 29 y o  female MRN: [id number]  Unit/Bed#: -01 Encounter: [id number]    Obstetrician:   Dr Xin Velazquez    Assistant: Dr Marjorie Burns    Pre-Delivery Diagnosis: Term pregnancy  Induced labor  Single fetus    Post-Delivery Diagnosis: Same as above - Delivered  Viable female fetus  2nd degree laceration    Procedure: Spontaneous vaginal delivery  Repair 2nd degree spontaneous laceration      Delivery Date and Time: 2018 at  0048    Estimated Blood Loss:  [id number]           Complications:  None    Brief description of labor:  Please see additional notes for details of the labor course  Description of Procedure: With maternal expulsive efforts, the patient delivered a viable female   The position at delivery was [place]  The fetal vertex delivered spontaneously  There was a nuchal cord present, one loop around fetal neck, easily reduced with gentle traction  The anterior shoulder delivered atraumatically with maternal expulsive forces and the assistance of gentle downward traction  The posterior shoulder delivered with maternal expulsive forces and the assistance of gentle upward traction  The remainder of the fetus delivered spontaneously  Upon delivery, the  was placed on the mother's abdomen and the umbilical cord was clamped and cut  The  resuscitator evaluated the  at bedside  Arterial and venous cord blood gases and cord blood were collected for analysis  These were sent to the lab  Active management of the third stage of labor included uterine massage and administration of IV Pitocin at 250 deysi-units per minute  The uterus was noted to contract down well  The placenta delivered spontaneously and was noted to have a centrally-inserted 3-vessel cord  It was sent for storage  The vagina, cervix, perineum, and rectum were inspected and there was noted to be a second degree lac present      Laceration Repair  Patient was comfortable with epidural at that time  A 2nd degree was identified and required repair  The laceration was repaired with 3-0 Vicryl rapide  The repair inspected and found to have good tissue reapproximation and hemostasis  Hemostasis was confirmed at the conclusion of this procedure  At the conclusion of the delivery, all needle, sponge, and instrument counts were noted to be correct  The patient tolerated the procedure well and was allowed to recover in labor and delivery room  Dr Sarah Richards MD was present      Tierra Lua MD  OB/GYN PGY-2  5/24/2018 3:05 AM

## 2018-05-24 NOTE — OB LABOR/OXYTOCIN SAFETY PROGRESS
Oxytocin Safety Progress Check Note - Bora Heads 29 y o  female MRN: [id number]    Unit/Bed#: -01 Encounter: [id number]    Obstetric History       T0      L0     SAB0   TAB0   Ectopic0   Multiple0   Live Births0      Gestational Age: 41w1d  Dose (shania-units/min) Oxytocin: 8 shania-units/min  Contraction Frequency (minutes): 2-3 5  Contraction Quality: Moderate  Tachysystole: No   Dilation: 8-9        Effacement (%): 90  Station: 0  Baseline Rate: 150 bpm  Fetal Heart Rate: 152 BPM  FHR Category: Category I          Notes/comments:   Making good cervical change  Continue to monitor            Harper Councilman, MD 2018 10:20 PM

## 2018-05-25 VITALS
OXYGEN SATURATION: 97 % | BODY MASS INDEX: 41.02 KG/M2 | TEMPERATURE: 98.4 F | HEART RATE: 86 BPM | WEIGHT: 293 LBS | SYSTOLIC BLOOD PRESSURE: 132 MMHG | DIASTOLIC BLOOD PRESSURE: 67 MMHG | RESPIRATION RATE: 18 BRPM | HEIGHT: 71 IN

## 2018-05-25 PROCEDURE — 99024 POSTOP FOLLOW-UP VISIT: CPT | Performed by: OBSTETRICS & GYNECOLOGY

## 2018-05-25 RX ORDER — ACETAMINOPHEN 325 MG/1
TABLET ORAL
Qty: 30 TABLET | Refills: 0
Start: 2018-05-25 | End: 2018-07-02 | Stop reason: ALTCHOICE

## 2018-05-25 RX ORDER — IBUPROFEN 600 MG/1
600 TABLET ORAL EVERY 6 HOURS PRN
Qty: 30 TABLET | Refills: 0
Start: 2018-05-25 | End: 2018-07-02 | Stop reason: ALTCHOICE

## 2018-05-25 RX ADMIN — IBUPROFEN 600 MG: 600 TABLET ORAL at 10:59

## 2018-05-25 RX ADMIN — IBUPROFEN 600 MG: 600 TABLET ORAL at 04:45

## 2018-05-25 RX ADMIN — Medication 1 TABLET: at 09:19

## 2018-05-25 RX ADMIN — DOCUSATE SODIUM 100 MG: 100 CAPSULE, LIQUID FILLED ORAL at 09:19

## 2018-05-25 NOTE — PLAN OF CARE
DISCHARGE PLANNING     Discharge to home or other facility with appropriate resources Completed        INFECTION - ADULT     Absence or prevention of progression during hospitalization Completed        PAIN - ADULT     Verbalizes/displays adequate comfort level or baseline comfort level Completed        POSTPARTUM     Experiences normal postpartum course Completed     Appropriate maternal -  bonding Completed     Establishment of infant feeding pattern Completed     Incision(s), wounds(s) or drain site(s) healing without S/S of infection Completed        SAFETY ADULT     Patient will remain free of falls Completed     Maintain or return to baseline ADL function Completed     Maintain or return mobility status to optimal level Completed

## 2018-05-25 NOTE — PLAN OF CARE
DISCHARGE PLANNING     Discharge to home or other facility with appropriate resources Progressing        INFECTION - ADULT     Absence or prevention of progression during hospitalization Progressing        PAIN - ADULT     Verbalizes/displays adequate comfort level or baseline comfort level Progressing        POSTPARTUM     Experiences normal postpartum course Progressing     Appropriate maternal -  bonding Progressing     Establishment of infant feeding pattern Progressing     Incision(s), wounds(s) or drain site(s) healing without S/S of infection Progressing        SAFETY ADULT     Patient will remain free of falls Progressing     Maintain or return to baseline ADL function Progressing     Maintain or return mobility status to optimal level Progressing

## 2018-05-25 NOTE — PROGRESS NOTES
Postpartum Progress Note       PROCEDURE: Spontaneous Vaginal Delivery    SUBJECTIVE:    Pain: mild cramping pain    Tolerating Oral Intake: yes     Voiding: yes    Flatus: yes    Bowel Movement: no    Ambulating: yes    Breastfeeding: yes    Chest Pain: no    Shortness of Breath: no    Leg Pain/Discomfort: no    Lochia: minimal    Other: no ext tenderness or pain      OBJECTIVE:    General: No Acute Distress, morbidly obese     Cardiovascular: Regular, Rate and Rhythm, no murmur, gallop or rub     Lungs: Clear to Auscultation Bilaterally, no wheezing, rhonchi or rales     Abdomen: Soft, non-distended, non-tender, no rebound, guarding or CVA tenderness     Fundus: Firm & Non-Tender     Fundal Location: at the umbilicus    Lower Extremities: Non-Tender    Vitals:    05/25/18 0425   BP: 135/69   Pulse: 85   Resp: 18   Temp: 98 6 °F (37 °C)   SpO2:          Results from last 7 days  Lab Units 05/22/18  2344   WBC Thousand/uL 12 79*   HEMOGLOBIN g/dL 12 4   HEMATOCRIT % 36 0   PLATELETS Thousands/uL 260   NEUTROS PCT % 62   MONOS PCT % 9       Results from last 7 days  Lab Units 05/22/18  2344   SODIUM mmol/L 140   POTASSIUM mmol/L 3 5   CHLORIDE mmol/L 110*   CO2 mmol/L 22   BUN mg/dL 8   CREATININE mg/dL 0 73   CALCIUM mg/dL 9 0   TOTAL PROTEIN g/dL 6 2*   BILIRUBIN TOTAL mg/dL 0 18*   ALK PHOS U/L 115   ALT U/L 15   AST U/L 14   GLUCOSE RANDOM mg/dL 129     LABS / TESTS / MEDICATION:      Admission on 05/22/2018   Component Date Value    WBC 05/22/2018 12 79*    RBC 05/22/2018 4 11     Hemoglobin 05/22/2018 12 4     Hematocrit 05/22/2018 36 0     MCV 05/22/2018 88     MCH 05/22/2018 30 2     MCHC 05/22/2018 34 4     RDW 05/22/2018 13 2     MPV 05/22/2018 11 3     Platelets 94/39/9994 260     nRBC 05/22/2018 0     Neutrophils Relative 05/22/2018 62     Lymphocytes Relative 05/22/2018 27     Monocytes Relative 05/22/2018 9     Eosinophils Relative 05/22/2018 1     Basophils Relative 05/22/2018 0     Neutrophils Absolute 05/22/2018 7 96*    Lymphocytes Absolute 05/22/2018 3 44     Monocytes Absolute 05/22/2018 1 14     Eosinophils Absolute 05/22/2018 0 15     Basophils Absolute 05/22/2018 0 02     RPR 05/22/2018 Non-Reactive     Creatinine, Ur 05/22/2018 139 0     Protein Urine Random 05/22/2018 10     Prot/Creat Ratio, Ur 05/22/2018 0 07     Sodium 05/22/2018 140     Potassium 05/22/2018 3 5     Chloride 05/22/2018 110*    CO2 05/22/2018 22     Anion Gap 05/22/2018 8     BUN 05/22/2018 8     Creatinine 05/22/2018 0 73     Glucose 05/22/2018 129     Calcium 05/22/2018 9 0     AST 05/22/2018 14     ALT 05/22/2018 15     Alkaline Phosphatase 05/22/2018 115     Total Protein 05/22/2018 6 2*    Albumin 05/22/2018 2 6*    Total Bilirubin 05/22/2018 0 18*    eGFR 05/22/2018 111     Clarity, UA 05/22/2018 Cloudy     Color, UA 05/22/2018 Yellow     Specific Gravity, UA 05/22/2018 1 017     pH, UA 05/22/2018 6 5     Glucose, UA 05/22/2018 100 (1/10%)*    Ketones, UA 05/22/2018 Negative     Blood, UA 05/22/2018 Negative     Protein, UA 05/22/2018 Negative     Nitrite, UA 05/22/2018 Negative     Bilirubin, UA 05/22/2018 Negative     Urobilinogen, UA 05/22/2018 1 0     Leukocytes, UA 05/22/2018 Negative     WBC, UA 05/22/2018 None Seen     RBC, UA 05/22/2018 None Seen     Bacteria, UA 05/22/2018 Occasional     Ca Oxalate Thao, UA 05/22/2018 Moderate*    Epithelial Cells 05/22/2018 Occasional     MUCOUS THREADS 05/22/2018 Occasional*    ABO Grouping 05/23/2018 O     Rh Factor 05/23/2018 Positive     Antibody Screen 05/23/2018 Negative     Specimen Expiration Date 05/23/2018 [id number]     pH, Cord Earle 05/24/2018 7 266     pCO2, Cord Earle 05/24/2018 40 0     pO2, Cord Earle 05/24/2018 25 6     HCO3, Cord Earle 05/24/2018 17 8     Base Exc, Cord Earle 05/24/2018 -8 6*    O2 Cont, Cord Earle 05/24/2018 10 9     O2 HGB,VENOUS CORD 05/24/2018 49 9     pH, Cord Art 05/24/2018 7 148*    pCO2, Cord Art 2018 56 6     pO2, Cord Art 2018 24 0     HCO3, Cord Art 2018 19 2     Base Exc, Cord Art 2018 -10 3*    O2 Content, Cord Art 2018 8 0     O2 Hgb, Arterial Cord 2018 36 1        Current Facility-Administered Medications   Medication Dose Route Frequency    acetaminophen (TYLENOL) tablet 650 mg  650 mg Oral Q6H PRN    aluminum-magnesium hydroxide-simethicone (MYLANTA) 200-200-20 mg/5 mL oral suspension 15 mL  15 mL Oral Q6H PRN    benzocaine-menthol-lanolin-aloe (DERMOPLAST) 20-0 5 % topical spray   Topical 4x Daily PRN    bisacodyl (DULCOLAX) rectal suppository 10 mg  10 mg Rectal Daily PRN    butorphanol (STADOL) injection 1 mg  1 mg Intravenous Q3H PRN    calcium carbonate (TUMS) chewable tablet 1,000 mg  1,000 mg Oral Daily PRN    diphenhydrAMINE (BENADRYL) tablet 25 mg  25 mg Oral Q6H PRN    docusate sodium (COLACE) capsule 100 mg  100 mg Oral BID PRN    hydrocortisone 1 % cream 1 application  1 application Topical PRN    ibuprofen (MOTRIN) tablet 600 mg  600 mg Oral Q6H PRN    ondansetron (ZOFRAN) injection 4 mg  4 mg Intravenous Q8H PRN    oxyCODONE-acetaminophen (PERCOCET) 5-325 mg per tablet 1 tablet  1 tablet Oral Q4H PRN    oxyCODONE-acetaminophen (PERCOCET) 5-325 mg per tablet 2 tablet  2 tablet Oral Q4H PRN    prenatal multivitamin tablet 1 tablet  1 tablet Oral Daily    senna (SENOKOT) tablet 8 6 mg  1 tablet Oral HS PRN    simethicone (MYLICON) chewable tablet 80 mg  80 mg Oral 4x Daily PRN    sod citrate-citric acid (BICITRA) oral solution 30 mL  30 mL Oral 4x Daily PRN    witch hazel-glycerin (TUCKS) topical pad 1 pad  1 pad Topical PRN       ASSESSMENT AND PLAN:   Postpartum day # 1   Status post:     1  Continue Routine Postpartum Care  2  Encourage Ambulation  3  Advance diet as tolerated  4   Plan Contraception discussed: unsure yet, will discuss at pp visit    Anticipate d/c tmr     Signature/Title: Grant Kaur, MD  Date: 5/25/2018  Time: 6:35 AM

## 2018-06-01 LAB — PLACENTA IN STORAGE: NORMAL

## 2018-07-02 ENCOUNTER — POSTPARTUM VISIT (OUTPATIENT)
Dept: OBGYN CLINIC | Facility: CLINIC | Age: 33
End: 2018-07-02

## 2018-07-02 VITALS — SYSTOLIC BLOOD PRESSURE: 120 MMHG | BODY MASS INDEX: 39.61 KG/M2 | WEIGHT: 284 LBS | DIASTOLIC BLOOD PRESSURE: 72 MMHG

## 2018-07-02 DIAGNOSIS — Z30.41 ENCOUNTER FOR SURVEILLANCE OF CONTRACEPTIVE PILLS: ICD-10-CM

## 2018-07-02 PROBLEM — Z34.03 ENCOUNTER FOR SUPERVISION OF NORMAL FIRST PREGNANCY IN THIRD TRIMESTER: Status: RESOLVED | Noted: 2018-02-21 | Resolved: 2018-07-02

## 2018-07-02 PROBLEM — E66.01 MATERNAL MORBID OBESITY IN THIRD TRIMESTER, ANTEPARTUM (HCC): Status: RESOLVED | Noted: 2018-02-22 | Resolved: 2018-07-02

## 2018-07-02 PROBLEM — IMO0001 CEPHALIC VERSION: Status: RESOLVED | Noted: 2018-05-01 | Resolved: 2018-07-02

## 2018-07-02 PROBLEM — Z3A.41 41 WEEKS GESTATION OF PREGNANCY: Status: RESOLVED | Noted: 2018-05-22 | Resolved: 2018-07-02

## 2018-07-02 PROCEDURE — 99024 POSTOP FOLLOW-UP VISIT: CPT | Performed by: OBSTETRICS & GYNECOLOGY

## 2018-07-02 RX ORDER — FLUOXETINE HYDROCHLORIDE 20 MG/1
20 CAPSULE ORAL DAILY
COMMUNITY

## 2018-07-02 RX ORDER — NORETHINDRONE ACETATE AND ETHINYL ESTRADIOL AND FERROUS FUMARATE 1MG-20(24)
1 KIT ORAL DAILY
Qty: 84 TABLET | Refills: 1 | Status: SHIPPED | OUTPATIENT
Start: 2018-07-02 | End: 2019-01-02 | Stop reason: SDUPTHER

## 2018-07-02 RX ORDER — NORETHINDRONE ACETATE AND ETHINYL ESTRADIOL AND FERROUS FUMARATE 1MG-20(24)
1 KIT ORAL DAILY
COMMUNITY
End: 2018-07-02 | Stop reason: SDUPTHER

## 2018-07-02 NOTE — PROGRESS NOTES
Krystian Covington  1985    S:  29 y o  female here for postpartum visit  She is s/p Uncomplicated vaginal delivery on 5/24/18   Gender: female, "Maria R"  Weight: 8lb 9oz  Her lochia has resolved  She is Bottle feeding without problems, breastfeed x 1 months      She denies postpartum blues/depression  Her EPDS is 9  She has a history of anxiety pre-pregnancy  She has just restarted her Prozac, and plans to restart her Wellbutrin at the beginning of August when she has a follow up with her psychiatrist   She feels this is no worse than her baseline and she is coping well at this time  Last Pap: 12/2016 - Normal    We discussed contraceptive options in detail including birth control pills, patches, NuvaRing, DepoProvera, Mirena/Kyleena IUD, Nexplanon in detail  At this point she would like to go back on her OCPs  O:   /72 (BP Location: Left arm, Patient Position: Sitting, Cuff Size: Large)   Wt 129 kg (284 lb)   LMP 08/01/2017   BMI 39 61 kg/m²   She appears well and in no distress  Abdomen is soft and nontender  External genitals are normal  Vagina - normal, tear well healed, non-tender     A/P:  Postpartum visit  She will return in 3-4 months for her yearly exam, sooner PRN  Restart her OCPs  Follow up with psychiatrist for her anxiety  Plans on another pregnancy, but plans to wait > 1 year

## 2018-07-16 ENCOUNTER — ANESTHESIA EVENT (OUTPATIENT)
Dept: GASTROENTEROLOGY | Facility: HOSPITAL | Age: 33
End: 2018-07-16
Payer: COMMERCIAL

## 2018-07-17 ENCOUNTER — HOSPITAL ENCOUNTER (OUTPATIENT)
Facility: HOSPITAL | Age: 33
Setting detail: OUTPATIENT SURGERY
Discharge: HOME | End: 2018-07-17
Attending: COLON & RECTAL SURGERY | Admitting: COLON & RECTAL SURGERY
Payer: COMMERCIAL

## 2018-07-17 ENCOUNTER — ANESTHESIA (OUTPATIENT)
Dept: GASTROENTEROLOGY | Facility: HOSPITAL | Age: 33
End: 2018-07-17
Payer: COMMERCIAL

## 2018-07-17 VITALS
OXYGEN SATURATION: 97 % | HEIGHT: 71 IN | RESPIRATION RATE: 18 BRPM | WEIGHT: 285 LBS | TEMPERATURE: 98.6 F | DIASTOLIC BLOOD PRESSURE: 68 MMHG | BODY MASS INDEX: 39.9 KG/M2 | SYSTOLIC BLOOD PRESSURE: 109 MMHG | HEART RATE: 60 BPM

## 2018-07-17 DIAGNOSIS — K62.89 OTHER SPECIFIED DISEASES OF ANUS AND RECTUM: ICD-10-CM

## 2018-07-17 DIAGNOSIS — K62.5 HEMORRHAGE OF ANUS AND RECTUM: ICD-10-CM

## 2018-07-17 DIAGNOSIS — R19.4 CHANGE IN BOWEL HABITS: ICD-10-CM

## 2018-07-17 PROCEDURE — 88305 TISSUE EXAM BY PATHOLOGIST: CPT | Performed by: PATHOLOGY

## 2018-07-17 RX ORDER — PROPOFOL 10 MG/ML
INJECTION, EMULSION INTRAVENOUS AS NEEDED
Status: DISCONTINUED | OUTPATIENT
Start: 2018-07-17 | End: 2018-07-17 | Stop reason: SURG

## 2018-07-17 RX ORDER — SODIUM CHLORIDE 9 MG/ML
125 INJECTION, SOLUTION INTRAVENOUS CONTINUOUS
Status: DISCONTINUED | OUTPATIENT
Start: 2018-07-17 | End: 2018-07-17 | Stop reason: HOSPADM

## 2018-07-17 RX ADMIN — PROPOFOL 50 MG: 10 INJECTION, EMULSION INTRAVENOUS at 08:27

## 2018-07-17 RX ADMIN — SODIUM CHLORIDE 125 ML/HR: 0.9 INJECTION, SOLUTION INTRAVENOUS at 07:55

## 2018-07-17 RX ADMIN — PROPOFOL 150 MG: 10 INJECTION, EMULSION INTRAVENOUS at 08:16

## 2018-07-17 RX ADMIN — PROPOFOL 50 MG: 10 INJECTION, EMULSION INTRAVENOUS at 08:19

## 2018-07-17 RX ADMIN — PROPOFOL 50 MG: 10 INJECTION, EMULSION INTRAVENOUS at 08:23

## 2018-07-17 NOTE — OP NOTE
**** GI/ENDOSCOPY REPORT ****     PATIENT NAME: JENELLE DONALD ------ VISIT ID:  Patient ID: [id number]   YOB: 1985     INTRODUCTION: Colonoscopy - A 29 female patient presents for an outpatient   Colonoscopy at [address] [place] [address]  PREVIOUS COLONOSCOPY: No prior colonoscopy  INDICATIONS: Rectal bleeding  Screening for family history of colorectal   cancer, including the patient's mother  Surveillance  CONSENT:  The benefits, risks, and alternatives to the procedure were   discussed and informed consent was obtained from the patient  PREPARATION: EKG, pulse, pulse oximetry, and blood pressure were monitored   throughout the procedure  The patient was identified by myself both   verbally and by visual inspection of ID band  MEDICATIONS: Anesthesia-check records     PROCEDURE:  The endoscope was passed without difficulty through the anus   under direct visualization and advanced to the cecum, confirmed by   appendiceal orifice and ileocecal valve  The scope was withdrawn and the   mucosa was carefully examined  The quality of the preparation was good  Cecal Intubation Time: Minute(s) Scope Withdrawal Time: Minute(s)     RECTAL EXAM: Normal rectal exam      FINDINGS:  Internal grade II hemorrhoids were found  A single polyp,   measuring between 5 and 10 mm in size, was found in the sigmoid colon  The polyp was completely removed by snare cautery polypectomy  Retrieved   and placed in jar 1  Otherwise, the colon appeared to be normal      COMPLICATIONS: There were no complications  IMPRESSIONS: Internal grade II hemorrhoids  A single polyp found in the   sigmoid colon; removed by snare cautery polypectomy  RECOMMENDATIONS: Colonoscopy recommended in 5 years  patient will be sent a   reminder letter  Discharge home when standard parameters are met  Start   high fiber diet  Follow-up on the results of the biopsy specimens     Follow-up appointment in attending physician's office in 2 weeks  ESTIMATED BLOOD LOSS: None  PATHOLOGY SPECIMENS: Completely removed by snare cautery polypectomy (jar   1)  Yes     PROCEDURE CODES: : Colonoscopy with snare polypectomy     ICD-9 Codes: 569 3 Hemorrhage of rectum and anus V16 0 Family history of   malignant neoplasm of gastrointestinal tract 211 3 Benign neoplasm of colon     ICD-10 Codes: K62 5 Hemorrhage of anus and rectum Z80 0 Family history of   malignant neoplasm of digestive organs K64 1 Second degree hemorrhoids   K63 5 Polyp of colon     PERFORMED BY: YEYO Gamboa  on 07/17/2018  Version 1, electronically signed by YEYO Silva  on 07/17/2018   at 08:46

## 2018-07-17 NOTE — ANESTHESIA PREPROCEDURE EVALUATION
Review of Systems/Medical History  Patient summary reviewed  Chart reviewed  No history of anesthetic complications     Cardiovascular  Negative cardio ROS    Pulmonary  Negative pulmonary ROS        GI/Hepatic    Bowel prep       Negative  ROS        Endo/Other    Obesity    GYN      Comment: No unprotected sex since baby and currently has her period     Hematology  Negative hematology ROS      Musculoskeletal  Negative musculoskeletal ROS        Neurology    Headaches,    Psychology   Negative psychology ROS Anxiety, Depression ,     Comment: OCD         Physical Exam    Airway    Mallampati score: I  TM Distance: >3 FB  Neck ROM: full     Dental   No notable dental hx     Cardiovascular  Comment: Negative ROS,     Pulmonary      Other Findings        Anesthesia Plan  ASA Score- 2     Anesthesia Type- IV sedation with anesthesia with ASA Monitors  Additional Monitors:   Airway Plan:         Plan Factors-  Patient did not smoke on day of surgery  Induction- intravenous  Postoperative Plan-     Informed Consent- Anesthetic plan and risks discussed with patient  I personally reviewed this patient with the CRNA  Discussed and agreed on the Anesthesia Plan with the CRNA  Doretha Mercer

## 2018-07-17 NOTE — H&P
History and Physical   Colon and Rectal Surgery   Aditi Barber 29 y o  female MRN: [id number]  Unit/Bed#: LUIS M [facility]ti Encounter: [id number]  07/17/18   @NOW    No chief complaint on file  History of Present Illness   HPI:  Aditi Barber is a 29 y o  female who presents occasional rectal bleeding and family history of colon cancer  No prior exams        Historical Information   Past Medical History:   Diagnosis Date    Anxiety     Migraine     Varicella     childhood    Varicose veins of both lower extremities      Past Surgical History:   Procedure Laterality Date    NO PAST SURGERIES         Meds/Allergies     Prescriptions Prior to Admission   Medication    cetirizine (ZyrTEC) 10 mg tablet    FLUoxetine (PROzac) 20 mg capsule    norethindrone-ethinyl estradiol-ferrous fumarate (LOESTIN 24 FE) 1-20 MG-MCG(24) per tablet         Current Facility-Administered Medications:     sodium chloride 0 9 % infusion, 125 mL/hr, Intravenous, Continuous, Topher Albert MD, Last Rate: 125 mL/hr at 07/17/18 0755, 125 mL/hr at 07/17/18 0755    No Known Allergies      Social History   History   Alcohol Use    Yes     Comment: socially     History   Drug Use No     History   Smoking Status    Never Smoker   Smokeless Tobacco    Never Used         Family History:   Family History   Problem Relation Age of Onset    Cancer Mother         colon,liver,lung    Depression Mother     Deep vein thrombosis Brother     Thrombophilia Brother          Objective     Current Vitals:   Blood Pressure: 125/72 (07/17/18 0748)  Pulse: 82 (07/17/18 0748)  Temperature: 98 6 °F (37 °C) (07/17/18 0748)  Temp Source: Oral (07/17/18 0748)  Respirations: 18 (07/17/18 0748)  Height: 5' 11" (180 3 cm) (07/17/18 0748)  Weight - Scale: 129 kg (285 lb) (07/17/18 0748)  SpO2: 97 % (07/17/18 0748)  No intake or output data in the 24 hours ending 07/17/18 0809    Physical Exam:  General:  Resting comfortably in bed   Eyes:Sclera anicteric  ENT: Trachea midline  Pulm:  Symmetric chest raise  No respiratory Distress  CV:  Regular on monitor  Abdomen:  Soft NT ND  Extremities:  No clubbing/ cyanosis/ edema    Lab Results: I have personally reviewed pertinent lab results  Imaging: I have personally reviewed pertinent reports  ASSESSMENT:  Ciera Eckert is a 29 y o  female who presents for outpatient colonoscopy  PLAN:  For colonoscopy    Risks/ Benefits reviewed to include but not limited to anesthesia, bleeding, missed lesions, and colonoscopic perforation requiring surgery

## 2019-01-02 ENCOUNTER — TELEPHONE (OUTPATIENT)
Dept: OBGYN CLINIC | Facility: CLINIC | Age: 34
End: 2019-01-02

## 2019-01-02 DIAGNOSIS — Z30.41 ENCOUNTER FOR SURVEILLANCE OF CONTRACEPTIVE PILLS: Primary | ICD-10-CM

## 2019-01-02 RX ORDER — NORETHINDRONE ACETATE AND ETHINYL ESTRADIOL AND FERROUS FUMARATE 1MG-20(24)
1 KIT ORAL DAILY
Qty: 84 TABLET | Refills: 0 | Status: SHIPPED | OUTPATIENT
Start: 2019-01-02 | End: 2019-01-31 | Stop reason: SDUPTHER

## 2019-01-02 NOTE — TELEPHONE ENCOUNTER
Patient needs a refill of her birth control  She is scheduled for her yearly exam on 1/30/2019 and just needs enough to get her thru to her appointment

## 2019-01-31 ENCOUNTER — ANNUAL EXAM (OUTPATIENT)
Dept: OBGYN CLINIC | Facility: CLINIC | Age: 34
End: 2019-01-31
Payer: COMMERCIAL

## 2019-01-31 VITALS — DIASTOLIC BLOOD PRESSURE: 86 MMHG | SYSTOLIC BLOOD PRESSURE: 134 MMHG | BODY MASS INDEX: 42.26 KG/M2 | WEIGHT: 293 LBS

## 2019-01-31 DIAGNOSIS — Z30.41 ENCOUNTER FOR SURVEILLANCE OF CONTRACEPTIVE PILLS: ICD-10-CM

## 2019-01-31 DIAGNOSIS — Z01.419 ENCOUNTER FOR GYNECOLOGICAL EXAMINATION: Primary | ICD-10-CM

## 2019-01-31 PROCEDURE — S0612 ANNUAL GYNECOLOGICAL EXAMINA: HCPCS | Performed by: PHYSICIAN ASSISTANT

## 2019-01-31 PROCEDURE — G0145 SCR C/V CYTO,THINLAYER,RESCR: HCPCS | Performed by: PHYSICIAN ASSISTANT

## 2019-01-31 RX ORDER — BUPROPION HYDROCHLORIDE 150 MG/1
TABLET ORAL
COMMUNITY
Start: 2018-08-30 | End: 2021-03-11

## 2019-01-31 RX ORDER — NORETHINDRONE ACETATE AND ETHINYL ESTRADIOL AND FERROUS FUMARATE 1MG-20(24)
1 KIT ORAL DAILY
Qty: 84 TABLET | Refills: 3 | Status: SHIPPED | OUTPATIENT
Start: 2019-01-31 | End: 2019-03-07 | Stop reason: SDUPTHER

## 2019-02-01 NOTE — ASSESSMENT & PLAN NOTE
Annual exam and pap performed since due for HPV test, will call if abnormal or send letter if normal  OCP refill sent via EMR  RTO 1 year

## 2019-02-04 LAB
LAB AP GYN PRIMARY INTERPRETATION: NORMAL
Lab: NORMAL

## 2019-03-07 DIAGNOSIS — Z30.41 ENCOUNTER FOR SURVEILLANCE OF CONTRACEPTIVE PILLS: ICD-10-CM

## 2019-03-07 RX ORDER — NORETHINDRONE ACETATE AND ETHINYL ESTRADIOL AND FERROUS FUMARATE 1MG-20(24)
1 KIT ORAL DAILY
Qty: 84 TABLET | Refills: 3 | Status: SHIPPED | OUTPATIENT
Start: 2019-03-07 | End: 2020-02-06 | Stop reason: SDUPTHER

## 2019-03-07 NOTE — TELEPHONE ENCOUNTER
Due to insurance patient needs her BC refills changed  Per her insurance, 1 month can be sent to [facility] (she only has 3 pills left and can't wait for [facility]) and then the rest of the refills can be sent to [facility]

## 2019-03-11 DIAGNOSIS — Z30.41 ENCOUNTER FOR SURVEILLANCE OF CONTRACEPTIVE PILLS: ICD-10-CM

## 2019-03-11 RX ORDER — NORETHINDRONE ACETATE AND ETHINYL ESTRADIOL AND FERROUS FUMARATE 1MG-20(24)
1 KIT ORAL DAILY
Qty: 84 TABLET | Refills: 3 | Status: CANCELLED | OUTPATIENT
Start: 2019-03-11 | End: 2019-06-03

## 2019-09-03 NOTE — PROGRESS NOTES
Assessment/Plan  Problem List Items Addressed This Visit        Other    Encounter for gynecological examination - Primary     Annual exam and pap performed since due for HPV test, will call if abnormal or send letter if normal  OCP refill sent via EMR  RTO 1 year         Relevant Orders    Liquid-based pap, screening      Other Visit Diagnoses     Encounter for surveillance of contraceptive pills        Relevant Medications    norethindrone-ethinyl estradiol-ferrous fumarate (LOESTIN 24 FE) 1-20 MG-MCG(24) per tablet        Subjective   Ulysses Josh is a 34 y o  female who presents for annual GYN exam  She denies any changes in Medical, Surgical or Family  Periods are rare, pt on loestrin 24, no problems  Dysmenorrhea:none  She denies intermenstrual bleeding, spotting, or discharge  She reports that she is sexually active with 1 partner and using OCP (estrogen/progesterone) for contraception  Last Pap smear: 2016  Regular self breast exam: yes    Family history of uterine or ovarian cancer: no  Family history of breast cancer: no  Family history of colon cancer: yes - mother dx age 52, passed awy  Pt had colonoscopy 2018 WNL, rec every 5 years    Menstrual History:  OB History      Para Term  AB Living    1 1 1     1    SAB TAB Ectopic Multiple Live Births          0 1         No LMP recorded  Patient is not currently having periods (Reason: Birth Control)  The following portions of the patient's history were reviewed and updated as appropriate: allergies, current medications, past family history, past medical history, past social history, past surgical history and problem list     Review of Systems  Review of Systems   Constitutional: Negative for chills and fever  Respiratory: Negative for shortness of breath  Cardiovascular: Negative for chest pain  Gastrointestinal: Negative for abdominal pain     Genitourinary: Negative for dysuria, pelvic pain, vaginal bleeding, vaginal discharge and vaginal pain  Negative for breast pain or lumps  Negative for stress urinary incontinence  Neurological: Negative for headaches  Objective   /86 (BP Location: Right arm)   Wt (!) 137 kg (303 lb)   BMI 42 26 kg/m²     Physical Exam   Constitutional: She is oriented to person, place, and time  She appears well-developed and well-nourished  Neck: No thyromegaly present  Cardiovascular: Normal rate and regular rhythm  Pulmonary/Chest: Effort normal and breath sounds normal  Right breast exhibits no mass, no nipple discharge, no skin change and no tenderness  Left breast exhibits no mass, no nipple discharge, no skin change and no tenderness  Abdominal: Soft  There is no tenderness  There is no rebound and no guarding  Genitourinary: There is no rash or lesion on the right labia  There is no rash or lesion on the left labia  Uterus is not enlarged and not tender  Cervix exhibits no motion tenderness and no discharge  Right adnexum displays no mass and no tenderness  Left adnexum displays no mass and no tenderness  No bleeding in the vagina  No vaginal discharge found  Neurological: She is alert and oriented to person, place, and time  Psychiatric: She has a normal mood and affect  Nursing note and vitals reviewed  (3) adequate

## 2020-02-06 ENCOUNTER — ANNUAL EXAM (OUTPATIENT)
Dept: OBGYN CLINIC | Facility: CLINIC | Age: 35
End: 2020-02-06
Payer: COMMERCIAL

## 2020-02-06 VITALS
WEIGHT: 293 LBS | HEIGHT: 71 IN | DIASTOLIC BLOOD PRESSURE: 80 MMHG | SYSTOLIC BLOOD PRESSURE: 112 MMHG | BODY MASS INDEX: 41.02 KG/M2

## 2020-02-06 DIAGNOSIS — Z11.51 SCREENING FOR HUMAN PAPILLOMAVIRUS: ICD-10-CM

## 2020-02-06 DIAGNOSIS — Z01.419 ENCOUNTER FOR GYNECOLOGICAL EXAMINATION: Primary | ICD-10-CM

## 2020-02-06 DIAGNOSIS — Z30.41 ENCOUNTER FOR SURVEILLANCE OF CONTRACEPTIVE PILLS: ICD-10-CM

## 2020-02-06 PROCEDURE — 87624 HPV HI-RISK TYP POOLED RSLT: CPT | Performed by: NURSE PRACTITIONER

## 2020-02-06 PROCEDURE — S0612 ANNUAL GYNECOLOGICAL EXAMINA: HCPCS | Performed by: NURSE PRACTITIONER

## 2020-02-06 PROCEDURE — G0145 SCR C/V CYTO,THINLAYER,RESCR: HCPCS | Performed by: NURSE PRACTITIONER

## 2020-02-06 RX ORDER — NORETHINDRONE ACETATE AND ETHINYL ESTRADIOL AND FERROUS FUMARATE 1MG-20(24)
1 KIT ORAL DAILY
Qty: 90 TABLET | Refills: 3 | Status: SHIPPED | OUTPATIENT
Start: 2020-02-06 | End: 2021-01-07

## 2020-02-06 NOTE — PROGRESS NOTES
Encounter for gynecological examination  Normal findings on routine gyn exam  Advised monthly SBE, annual CBE and baseline screening mammo at age 38  Reviewed ASCCP guidelines and recommended pap with cotesting q3 yrs for this low risk patient-collected today  STI testing was offered and the patient declines; she reports low risk  The patient desires to continue current contraceptive method  Diet/activity recommendations:  Encouraged daily Ca++ and vitamin D intake as well as daily weight bearing exercise for promotion of bone health  RTO in one year or sooner PRN  Had colonoscopy 2018 -+ polyps-mom had colon CA  Encounter for surveillance of contraceptive pills  Happy with current OCP  Denies ACHES, breakthrough bleeding, nausea or other side effects  Refill given for one year  RTO 1 year or prn problems or concerns  Diagnoses and all orders for this visit:    Encounter for gynecological examination  -     Liquid-based pap, screening    Encounter for surveillance of contraceptive pills  -     norethindrone-ethinyl estradiol-ferrous fumarate (LOESTIN 24 FE) 1-20 MG-MCG(24) per tablet; Take 1 tablet by mouth daily    Screening for human papillomavirus  -     Liquid-based pap, screening    Other orders  -     Cancel: Herpes simplex virus culture         Angelo Agrawal  1985      CC:  Yearly exam     S:Jessa is a  30 y o  female here for yearly exam  She denies breast concerns, abdominal/pelvic pain, abnormal vaginal discharge or bladder/bowel dysfunction  Her cycles are absent on OCP  Sexual activity: She is sexually active without pain, bleeding or dryness  She is a special ed   She has a 21 month old daughter and a 11year old step son  They are planning more children in the future  Contraception: She uses OCP  for contraception  STD testing:  She does not want STD testing today       Last Pap 2016 neg/neg   Last Colonoscopy 7/18 due to mom having colon cancer   SBE: Monthly       Family hx of breast cancer: denies   Family hx of ovarian cancer: denies   Family hx of colon cancer: mom       Current Outpatient Medications:     buPROPion (WELLBUTRIN XL) 150 mg 24 hr tablet, , Disp: , Rfl:     FLUoxetine (PROzac) 20 mg capsule, Take 20 mg by mouth daily, Disp: , Rfl:     norethindrone-ethinyl estradiol-ferrous fumarate (LOESTIN 24 FE) 1-20 MG-MCG(24) per tablet, Take 1 tablet by mouth daily, Disp: 90 tablet, Rfl: 3  Social History     Socioeconomic History    Marital status: Single     Spouse name: Not on file    Number of children: Not on file    Years of education: Not on file    Highest education level: Not on file   Occupational History    Not on file   Social Needs    Financial resource strain: Not on file    Food insecurity:     Worry: Not on file     Inability: Not on file    Transportation needs:     Medical: Not on file     Non-medical: Not on file   Tobacco Use    Smoking status: Never Smoker    Smokeless tobacco: Never Used   Substance and Sexual Activity    Alcohol use: Yes     Comment: socially    Drug use: No    Sexual activity: Yes     Partners: Male     Birth control/protection: OCP   Lifestyle    Physical activity:     Days per week: Not on file     Minutes per session: Not on file    Stress: Not on file   Relationships    Social connections:     Talks on phone: Not on file     Gets together: Not on file     Attends Denominational service: Not on file     Active member of club or organization: Not on file     Attends meetings of clubs or organizations: Not on file     Relationship status: Not on file    Intimate partner violence:     Fear of current or ex partner: Not on file     Emotionally abused: Not on file     Physically abused: Not on file     Forced sexual activity: Not on file   Other Topics Concern    Not on file   Social History Narrative    Uses seat belt    Daily coffee consumption     Family History   Problem Relation Age of Onset    Cancer Mother         colon,liver,lung   Maxime Pila Depression Mother     Deep vein thrombosis Brother     Thrombophilia Brother     No Known Problems Father     No Known Problems Maternal Grandmother     No Known Problems Maternal Grandfather       Past Medical History:   Diagnosis Date    Anxiety     Migraine     Varicella     childhood    Varicose veins of both lower extremities         Review of Systems   Constitutional: Negative for appetite change, fatigue and unexpected weight change  Respiratory: Negative for shortness of breath  Cardiovascular: Negative for chest pain and leg swelling  Gastrointestinal: Negative for abdominal pain  Endocrine: Negative for cold intolerance and heat intolerance  Breasts:  Negative for tenderness, pain or masses  Genitourinary: Negative  Negative for dyspareunia, dysuria, flank pain, frequency, genital sores, hematuria and pelvic pain  Musculoskeletal: Negative for back pain  Neurological: Negative for headaches  O:  Blood pressure 112/80, height 5' 11" (1 803 m), weight (!) 138 kg (305 lb), not currently breastfeeding  Patient appears well and is not in distress  ROM normal   Neck is supple without masses  Normal thyroid  Heart regular rate and rhythm  Lungs CTA bilaterally   Breasts are symmetrical without mass, tenderness, nipple discharge, skin changes or adenopathy  Abdomen is soft and nontender without masses  External genitals are normal without lesions or rashes  Urethral meatus and urethra are normal  Bladder is normal to palpation  Vagina is normal without discharge or bleeding  Cervix is normal without discharge or lesion  Uterus is normal, mobile, nontender without palpable mass  Adnexa are normal, nontender, without palpable mass     Skin warm and dry   Capillary refill < 2 seconds  Alert and oriented x 3 with normal affect

## 2020-02-06 NOTE — ASSESSMENT & PLAN NOTE
Normal findings on routine gyn exam  Advised monthly SBE, annual CBE and baseline screening mammo at age 38  Reviewed ASCCP guidelines and recommended pap with cotesting q3 yrs for this low risk patient-collected today  STI testing was offered and the patient declines; she reports low risk  The patient desires to continue current contraceptive method  Diet/activity recommendations:  Encouraged daily Ca++ and vitamin D intake as well as daily weight bearing exercise for promotion of bone health  RTO in one year or sooner PRN  Had colonoscopy 2018 -+ polyps-mom had colon CA

## 2020-02-06 NOTE — ASSESSMENT & PLAN NOTE
Happy with current OCP  Denies ACHES, breakthrough bleeding, nausea or other side effects  Refill given for one year  RTO 1 year or prn problems or concerns

## 2020-02-06 NOTE — PATIENT INSTRUCTIONS
Birth Control Pills   WHAT YOU NEED TO KNOW:   What are birth control pills? Birth control pills are also called oral contraceptives, or the pill  It is medicine that helps prevent pregnancy  Birth control pills work by preventing ovulation  Ovulation is when the ovaries make and release an egg cell each month  If this egg gets fertilized by sperm, pregnancy occurs  Birth control pills may also help to prevent pregnancy by keeping sperm from fertilizing an egg  What may be done before I can start taking birth control pills? You need to see your healthcare provider to get a prescription  Any of the following may be done before your healthcare provider gives you a prescription:  · Your healthcare provider will ask you about diseases and illnesses you have had in the past  He will check your risk for blood clots, heart conditions, or stroke  He will also check your blood pressure, and may do a breast and pelvic exam  A Pap smear may also be done during the pelvic exam  This is a test to make sure you do not have abnormal changes on your cervix  You may need other tests, such as a urine test, to make sure you are not pregnant  · Your healthcare provider will ask if you take any medicines and if you smoke  Smoking increases your risk for stroke, heart attack, or a blood clot in your lungs  If you smoke, you should not take certain kinds of birth control pills  What are the advantages of birth control pills? When birth control pills are used correctly, the chances of getting pregnant are very low  Birth control pills may help decrease bleeding and pain during your monthly period  They may also help prevent cancer of the uterus and ovaries  What are the disadvantages of birth control pills? You may have sudden changes in your mood or feelings while you take birth control pills  You may have nausea and decreased sex drive  You may have an increased appetite and rapid weight gain   You may also have bleeding in between periods, less frequent periods, vaginal dryness, and breast pain  Birth control pills will not protect you from sexually transmitted infections  Rarely, some birth control pills can increase your risk for a blood clot  This may become life-threatening  What should I do if I decide I want to get pregnant? If you are planning to have a baby, ask your healthcare provider when you may stop taking your birth control pills  It may take some time for you to start ovulating again  Ask your healthcare provider for more information about pregnancy after birth control pills  When should I start taking birth control pills after I have a baby? If you are not breastfeeding, you may start taking birth control pills 3 weeks after you give birth  You may be able to take certain types of birth control pills if you are breastfeeding  These pills can be started from 6 weeks to 6 months after you give birth  Ask your healthcare provider for more information about when to start taking birth control pills after you give birth  When should I contact my healthcare provider? · You have forgotten to take a birth control pill  · You have mood changes, such as depression, since starting birth control pills  · You have nausea or you are vomiting  · You have severe abdominal pain  · You missed a period and have questions or concerns about being pregnant  · You still have bleeding 4 months after taking birth control pills correctly  · You have questions or concerns about your condition or care  When should I seek immediate care or call 911? · Your arm or leg feels warm, tender, and painful  It may look swollen and red  · You feel lightheaded, short of breath, and have chest pain  · You cough up blood      · You have any of the following signs of a stroke:      ¨ Numbness or drooping on one side of your face     ¨ Weakness in an arm or leg    ¨ Confusion or difficulty speaking    ¨ Dizziness, a severe headache, or vision loss    · You have severe pain, numbness, or swelling in your arms or legs  CARE AGREEMENT:   You have the right to help plan your care  Learn about your health condition and how it may be treated  Discuss treatment options with your caregivers to decide what care you want to receive  You always have the right to refuse treatment  The above information is an  only  It is not intended as medical advice for individual conditions or treatments  Talk to your doctor, nurse or pharmacist before following any medical regimen to see if it is safe and effective for you  © 2017 [facility] Information is for End User's use only and may not be sold, redistributed or otherwise used for commercial purposes  All illustrations and images included in CareNotes® are the copyrighted property of A D A M [employer] Inc  or [employer]

## 2020-02-07 LAB
HPV HR 12 DNA CVX QL NAA+PROBE: NEGATIVE
HPV16 DNA CVX QL NAA+PROBE: NEGATIVE
HPV18 DNA CVX QL NAA+PROBE: NEGATIVE

## 2020-02-11 LAB
LAB AP GYN PRIMARY INTERPRETATION: NORMAL
Lab: NORMAL

## 2020-02-12 ENCOUNTER — TELEPHONE (OUTPATIENT)
Dept: OBGYN CLINIC | Facility: CLINIC | Age: 35
End: 2020-02-12

## 2021-01-07 DIAGNOSIS — Z30.41 ENCOUNTER FOR SURVEILLANCE OF CONTRACEPTIVE PILLS: ICD-10-CM

## 2021-01-07 RX ORDER — NORETHINDRONE ACETATE AND ETHINYL ESTRADIOL, AND FERROUS FUMARATE 1MG-20(24)
KIT ORAL
Qty: 60 TABLET | Refills: 0 | Status: SHIPPED | OUTPATIENT
Start: 2021-01-07 | End: 2021-02-10 | Stop reason: SDUPTHER

## 2021-01-27 ENCOUNTER — NURSE TRIAGE (OUTPATIENT)
Dept: OTHER | Facility: OTHER | Age: 36
End: 2021-01-27

## 2021-01-27 DIAGNOSIS — Z11.59 SPECIAL SCREENING EXAMINATION FOR UNSPECIFIED VIRAL DISEASE: Primary | ICD-10-CM

## 2021-01-27 DIAGNOSIS — Z11.59 SPECIAL SCREENING EXAMINATION FOR UNSPECIFIED VIRAL DISEASE: ICD-10-CM

## 2021-01-27 PROCEDURE — U0005 INFEC AGEN DETEC AMPLI PROBE: HCPCS | Performed by: FAMILY MEDICINE

## 2021-01-27 PROCEDURE — U0003 INFECTIOUS AGENT DETECTION BY NUCLEIC ACID (DNA OR RNA); SEVERE ACUTE RESPIRATORY SYNDROME CORONAVIRUS 2 (SARS-COV-2) (CORONAVIRUS DISEASE [COVID-19]), AMPLIFIED PROBE TECHNIQUE, MAKING USE OF HIGH THROUGHPUT TECHNOLOGIES AS DESCRIBED BY CMS-2020-01-R: HCPCS | Performed by: FAMILY MEDICINE

## 2021-01-27 NOTE — TELEPHONE ENCOUNTER
Reason for Disposition   [1] COVID-19 infection suspected by caller or triager AND [2] mild symptoms (cough, fever, or others) AND [9] no complications or SOB    Answer Assessment - Initial Assessment Questions  1  COVID-19 DIAGNOSIS: "Who made your Coronavirus (COVID-19) diagnosis?" "Was it confirmed by a positive lab test?" If not diagnosed by a HCP, ask "Are there lots of cases (community spread) where you live?" (See public health department website, if unsure)      symptoms  2  COVID-19 EXPOSURE: "Was there any known exposure to COVID before the symptoms began?" [facility] Definition of close contact: within 6 feet (2 meters) for a total of 15 minutes or more over a 24-hour period  *No Answer*  3  ONSET: "When did the COVID-19 symptoms start?"       1/26/21  4  WORST SYMPTOM: "What is your worst symptom?" (e g , cough, fever, shortness of breath, muscle aches)      *No Answer*  5  COUGH: "Do you have a cough?" If so, ask: "How bad is the cough?"        *No Answer*  6  FEVER: "Do you have a fever?" If so, ask: "What is your temperature, how was it measured, and when did it start?"      *No Answer*  7  RESPIRATORY STATUS: "Describe your breathing?" (e g , shortness of breath, wheezing, unable to speak)       *No Answer*  8  BETTER-SAME-WORSE: "Are you getting better, staying the same or getting worse compared to yesterday?"  If getting worse, ask, "In what way?"      *No Answer*  9   HIGH RISK DISEASE: "Do you have any chronic medical problems?" (e g , asthma, heart or lung disease, weak immune system, obesity, etc )      *No Answer*  10  PREGNANCY: "Is there any chance you are pregnant?" "When was your last menstrual period?"        *No Answer*  11  OTHER SYMPTOMS: "Do you have any other symptoms?"  (e g , chills, fatigue, headache, loss of smell or taste, muscle pain, sore throat; new loss of smell or taste especially support the diagnosis of COVID-19)        Fever, body aches, headache, cough    Protocols used:  CORONAVIRUS (COVID-19) DIAGNOSED OR SUSPECTED-ADULT-AH

## 2021-01-27 NOTE — TELEPHONE ENCOUNTER
Regarding: Covid symps fever body ache  ----- Message from Shameka Hughes sent at 1/27/2021 12:37 PM EST -----  Covid Exposure for in home  [place] "I have fever of 100 body ache headache and a cough"    No longer goes to pcp

## 2021-01-28 ENCOUNTER — TELEPHONE (OUTPATIENT)
Dept: OTHER | Facility: OTHER | Age: 36
End: 2021-01-28

## 2021-01-28 LAB — SARS-COV-2 RNA RESP QL NAA+PROBE: POSITIVE

## 2021-01-28 NOTE — TELEPHONE ENCOUNTER
Your test for the novel coronavirus, also known as COVID-19, was positive  The sample showed that the virus was present  Positive COVID-19 test results are reportable to the [place] Department of Health  You may receive a call from trained public health staff to conduct an interview  It is important to answer their call  They will ask you to verify who you are  During the call they will ask you about what symptoms you have, what you did before you got sick, and who you were close to while sick  The health department does this to make sure everyone stays healthy and to reduce the spread of the virus  If you would like to verify if the caller does in fact work in contact tracing, call the [address] [place] [address] at [employer] ([phone])  For additional information, please visit the [employer] website: www [employer] [place] gov     If you have any additional questions, we can schedule a virtual visit for you with a provider or call the [facility] [phone], option [phone], for care advice    For additional information, please visit the Coronavirus FAQ on the Catrina Garcia home page (Ioana Hough  [employer])  Pt states "I have an appointment scheduled in March to establish care " Pt offered follow up appointment with [facility]  Appointment scheduled 1/29

## 2021-01-29 ENCOUNTER — TELEMEDICINE (OUTPATIENT)
Dept: FAMILY MEDICINE CLINIC | Facility: CLINIC | Age: 36
End: 2021-01-29
Payer: COMMERCIAL

## 2021-01-29 DIAGNOSIS — U07.1 COVID-19: Primary | ICD-10-CM

## 2021-01-29 PROCEDURE — 99213 OFFICE O/P EST LOW 20 MIN: CPT | Performed by: INTERNAL MEDICINE

## 2021-01-29 NOTE — LETTER
January 29, 2021     Patient: Jewell Coffman   YOB: 1985   Date of Visit: 1/29/2021       To Whom it May Concern:    Jewell Coffman is under my professional care  She was seen in my office on 1/29/2021  Will be excused from work starting from 01/22/2021 until 02/01/2021  Good to go back  to work on 02/02/2021 if no fevers  If you have any questions or concerns, please don't hesitate to call           Sincerely,          Ella Marquez MD        CC: No Recipients

## 2021-01-29 NOTE — PROGRESS NOTES
COVID-19 Virtual Visit     Assessment/Plan:    Problem List Items Addressed This Visit     None         Disposition:       Patient was tested positive for COVID-19 on 01/27/2021  She developed symptoms on 01/23/2021  Overall she feels that her symptoms are slightly getting better  She is not  Febrile anymore  She will stay on isolation until 02/01/2021  She was instructed to let me know if she will feel that her symptoms are getting worse  I have spent 10 minutes directly with the patient  Encounter provider Yael Cadena MD    Provider located at [address] [place] [address] [place] [id number]    Recent Visits  No visits were found meeting these conditions  Showing recent visits within past 7 days and meeting all other requirements     Future Appointments  No visits were found meeting these conditions  Showing future appointments within next 150 days and meeting all other requirements        Patient agrees to participate in a virtual check in via telephone or video visit instead of presenting to the office to address urgent/immediate medical needs  Patient is aware this is a billable service  After connecting through Telephone, the patient was identified by name and date of birth  Nabila Crawford was informed that this was a telemedicine visit and that the exam was being conducted confidentially over secure lines  My office door was closed  Nabila Crawford acknowledged consent and understanding of privacy and security of the telemedicine visit  I informed the patient that I have reviewed her record in Epic and presented the opportunity for her to ask any questions regarding the visit today  The patient agreed to participate  Subjective:   Nabila Crawford is a 37 y o  female who is concerned about COVID-19  Patient's symptoms include fever, chills, nasal congestion, cough and headache   Patient denies sore throat, shortness of breath, nausea, vomiting, diarrhea and myalgias  Lab Results   Component Value Date    SARSCOV2 Positive (A) 01/27/2021    SARSCOV2 Not Detected 11/21/2020     Past Medical History:   Diagnosis Date    Anxiety     Migraine     Varicella     childhood    Varicose veins of both lower extremities      Past Surgical History:   Procedure Laterality Date    NO PAST SURGERIES      AR COLONOSCOPY FLX DX W/COLLJ SPEC WHEN PFRMD N/A 7/17/2018    Procedure: COLONOSCOPY;  Surgeon: Lexie Liriano MD;  Location: [facility]; Service: Colorectal     Current Outpatient Medications   Medication Sig Dispense Refill    buPROPion (WELLBUTRIN XL) 150 mg 24 hr tablet       FLUoxetine (PROzac) 20 mg capsule Take 20 mg by mouth daily      Zara 24 Fe 1-20 MG-MCG(24) per tablet TAKE 1 TABLET DAILY 60 tablet 0     No current facility-administered medications for this visit  No Known Allergies    Review of Systems   Constitutional: Positive for chills and fever  HENT: Positive for congestion  Negative for sore throat  Respiratory: Positive for cough  Negative for shortness of breath and wheezing  Gastrointestinal: Negative for diarrhea, nausea and vomiting  Musculoskeletal: Negative for arthralgias and myalgias  Neurological: Positive for headaches  Psychiatric/Behavioral: Negative for confusion  Objective: There were no vitals filed for this visit  Physical Exam  Neurological:      Mental Status: She is alert  VIRTUAL VISIT DISCLAIMER    Kashif Noyola acknowledges that she has consented to an online visit or consultation  She understands that the online visit is based solely on information provided by her, and that, in the absence of a face-to-face physical evaluation by the physician, the diagnosis she receives is both limited and provisional in terms of accuracy and completeness  This is not intended to replace a full medical face-to-face evaluation by the physician  Kashif Noyola understands and accepts these terms

## 2021-02-10 ENCOUNTER — ANNUAL EXAM (OUTPATIENT)
Dept: OBGYN CLINIC | Facility: CLINIC | Age: 36
End: 2021-02-10
Payer: COMMERCIAL

## 2021-02-10 VITALS — SYSTOLIC BLOOD PRESSURE: 120 MMHG | BODY MASS INDEX: 43.65 KG/M2 | WEIGHT: 293 LBS | DIASTOLIC BLOOD PRESSURE: 82 MMHG

## 2021-02-10 DIAGNOSIS — Z01.419 ENCOUNTER FOR GYNECOLOGICAL EXAMINATION: Primary | ICD-10-CM

## 2021-02-10 DIAGNOSIS — Z30.41 ENCOUNTER FOR SURVEILLANCE OF CONTRACEPTIVE PILLS: ICD-10-CM

## 2021-02-10 DIAGNOSIS — L30.9 DERMATITIS: ICD-10-CM

## 2021-02-10 PROCEDURE — S0612 ANNUAL GYNECOLOGICAL EXAMINA: HCPCS | Performed by: NURSE PRACTITIONER

## 2021-02-10 RX ORDER — CLOTRIMAZOLE AND BETAMETHASONE DIPROPIONATE 10; .64 MG/G; MG/G
CREAM TOPICAL 2 TIMES DAILY
Qty: 30 G | Refills: 0 | Status: SHIPPED | OUTPATIENT
Start: 2021-02-10 | End: 2021-02-10

## 2021-02-10 RX ORDER — CLOTRIMAZOLE AND BETAMETHASONE DIPROPIONATE 10; .64 MG/G; MG/G
CREAM TOPICAL 2 TIMES DAILY
Qty: 30 G | Refills: 0 | Status: SHIPPED | OUTPATIENT
Start: 2021-02-10 | End: 2022-02-16

## 2021-02-10 RX ORDER — NORETHINDRONE ACETATE AND ETHINYL ESTRADIOL, AND FERROUS FUMARATE 1MG-20(24)
1 KIT ORAL DAILY
Qty: 90 TABLET | Refills: 3 | Status: SHIPPED | OUTPATIENT
Start: 2021-02-10 | End: 2022-01-12

## 2021-02-10 NOTE — ASSESSMENT & PLAN NOTE
Normal findings on routine gyn exam  Advised monthly SBE, annual CBE and baseline screening mammo at age 38  Reviewed ASCCP guidelines and recommended pap with cotesting q3 yrs for this low risk patient-up to date  STI testing was offered and the patient declines; she reports low risk  The patient desires to continue current contraceptive method  Diet/activity recommendations:  Encouraged daily Ca++ and vitamin D intake as well as daily weight bearing exercise for promotion of bone health  RTO in one year or sooner PRN

## 2021-02-10 NOTE — ASSESSMENT & PLAN NOTE
Dermatitis @ introitus c/w candidiasis  Rx Lotrisone cream BID x 14 days  To call if symptoms don't improve or prn problems or concerns

## 2021-02-10 NOTE — PROGRESS NOTES
Encounter for gynecological examination    Normal findings on routine gyn exam  Advised monthly SBE, annual CBE and baseline screening mammo at age 38  Reviewed ASCCP guidelines and recommended pap with cotesting q3 yrs for this low risk patient-up to date  STI testing was offered and the patient declines; she reports low risk  The patient desires to continue current contraceptive method  Diet/activity recommendations:  Encouraged daily Ca++ and vitamin D intake as well as daily weight bearing exercise for promotion of bone health  RTO in one year or sooner PRN  Encounter for surveillance of contraceptive pills    Happy with current OCP  Denies ACHES, breakthrough bleeding, nausea or other side effects  Refill given for one year  RTO 1 year or prn problems or concerns  Dermatitis  Dermatitis @ introitus c/w candidiasis  Rx Lotrisone cream BID x 14 days  No intercourse x 7 days  To call if symptoms don't improve or prn problems or concerns  Diagnoses and all orders for this visit:    Encounter for gynecological examination    Encounter for surveillance of contraceptive pills  -     norethindrone-ethinyl estradiol-ferrous fumarate (Zara 24 Fe) 1-20 MG-MCG(24) per tablet; Take 1 tablet by mouth daily    Dermatitis  -     clotrimazole-betamethasone (LOTRISONE) 1-0 05 % cream; Apply topically 2 (two) times a day         Angela Slaughter  1985      CC:  Yearly exam     S:  Carrie Moreland is a 37 y o  obese female here for yearly exam  She denies breast concerns, abdominal/pelvic pain, abnormal vaginal discharge or bladder/bowel dysfunction  Her cycles are very light, not heavy or painful on OCP  Colby Guise Sexual activity: She is sexually active without bleeding or dryness  She is having some pain @ introitus for several months-feels raw -denies itching or vaginal discharge  She was recently dx with COVID and feeling ok  Contraception: She uses OCP  for contraception       STD testing:  She does not want STD testing today       Last Pap 2/20 neg/neg   Last Colonoscopy -every 5 years-last 2018   SBE yes     Family hx of breast cancer: denies  Family hx of ovarian cancer:denies  Family hx of colon cancer: mom in 45s     She is a special  graded      Current Outpatient Medications:     buPROPion (WELLBUTRIN XL) 150 mg 24 hr tablet, , Disp: , Rfl:     FLUoxetine (PROzac) 20 mg capsule, Take 20 mg by mouth daily, Disp: , Rfl:     norethindrone-ethinyl estradiol-ferrous fumarate (Zara 24 Fe) 1-20 MG-MCG(24) per tablet, Take 1 tablet by mouth daily, Disp: 90 tablet, Rfl: 3    clotrimazole-betamethasone (LOTRISONE) 1-0 05 % cream, Apply topically 2 (two) times a day, Disp: 30 g, Rfl: 0  Social History     Socioeconomic History    Marital status: Single     Spouse name: Not on file    Number of children: Not on file    Years of education: Not on file    Highest education level: Not on file   Occupational History    Not on file   Social Needs    Financial resource strain: Not on file    Food insecurity     Worry: Not on file     Inability: Not on file    Transportation needs     Medical: Not on file     Non-medical: Not on file   Tobacco Use    Smoking status: Never Smoker    Smokeless tobacco: Never Used   Substance and Sexual Activity    Alcohol use: Yes     Comment: socially    Drug use: No    Sexual activity: Yes     Partners: Male     Birth control/protection: OCP   Lifestyle    Physical activity     Days per week: Not on file     Minutes per session: Not on file    Stress: Not on file   Relationships    Social connections     Talks on phone: Not on file     Gets together: Not on file     Attends Bahai service: Not on file     Active member of club or organization: Not on file     Attends meetings of clubs or organizations: Not on file     Relationship status: Not on file    Intimate partner violence     Fear of current or ex partner: Not on file     Emotionally abused: Not on file Physically abused: Not on file     Forced sexual activity: Not on file   Other Topics Concern    Not on file   Social History Narrative    Uses seat belt    Daily coffee consumption     Family History   Problem Relation Age of Onset    Cancer Mother         colon,liver,lung   Jocelynn Slipper Depression Mother     Deep vein thrombosis Brother     Thrombophilia Brother     No Known Problems Father     No Known Problems Maternal Grandmother     No Known Problems Maternal Grandfather       Past Medical History:   Diagnosis Date    Anxiety     Migraine     Varicella     childhood    Varicose veins of both lower extremities         Review of Systems   Constitutional: Negative for appetite change, fatigue and unexpected weight change  Respiratory: Negative for shortness of breath  Cardiovascular: Negative for chest pain and leg swelling  Gastrointestinal: Negative for abdominal pain  Endocrine: Negative for cold intolerance and heat intolerance  Breasts:  Negative for tenderness, pain or masses  Genitourinary: Negative for dyspareunia, dysuria, flank pain, frequency, genital sores, hematuria, menstrual problem and pelvic pain  + for pain on penetration @ introitus   Musculoskeletal: Negative for back pain  Neurological: Negative for headaches  O:  Blood pressure 120/82, weight (!) 142 kg (313 lb), not currently breastfeeding  Patient appears well and is not in distress  ROM normal   Neck is supple without masses  Normal thyroid  Heart regular rate and rhythm  Lungs CTA bilaterally   Breasts are symmetrical without mass, tenderness, nipple discharge, skin changes or adenopathy  Abdomen is soft and nontender without masses  External genitals are normal without lesions or rashes  Redness noted @ introitus along with micro lacerations   Urethral meatus and urethra are normal but difficult to assess due to body habitus   Bladder is normal to palpation  Vagina is normal without discharge or bleeding  Cervix is normal without discharge or lesion  Uterus is normal, mobile, nontender without palpable mass  Adnexa are normal, nontender, without palpable mass but difficult to assess due to body habitus      Skin warm and dry   Capillary refill < 2 seconds  Alert and oriented x 3 with normal affect

## 2021-03-09 NOTE — PROGRESS NOTES
[facility] [facility]    NAME: Irene Desai  AGE: 37 y o  SEX: female  : 1985     DATE: 3/11/2021     Assessment and Plan:     Problem List Items Addressed This Visit        Other    Morbid obesity ([address] )     BMI Counseling: Body mass index is 43 79 kg/m²  The BMI is above normal  Nutrition recommendations include decreasing overall calorie intake  Exercise recommendations include moderate aerobic physical activity for 150 minutes/week and exercising 3-5 times per week  She declined referral to nutritionist           Relevant Orders    Lipid Panel with Direct LDL reflex    Comprehensive metabolic panel    CBC and differential    TSH, 3rd generation with Free T4 reflex    Recurrent depressive disorder, current episode mild (HCC)     Currently on bupropion  mg daily and fluoxetine 20 mg daily  Patient will continue with Psychiatry as directed  Will continue to monitor  Depression Screening Follow-up Plan: Patient's depression screening was positive with a PHQ-2 score of 0  Their PHQ-9 score was 1  Continue regular follow-up with their psychologist/therapist/psychiatrist who is managing their mental health condition(s)  Relevant Medications    buPROPion (WELLBUTRIN XL) 300 mg 24 hr tablet      Other Visit Diagnoses     Annual physical exam    -  Primary    Relevant Orders    Lipid Panel with Direct LDL reflex    Comprehensive metabolic panel    CBC and differential    TSH, 3rd generation with Free T4 reflex    Diabetes mellitus screening        Relevant Orders    Comprehensive metabolic panel    Lipid screening        Relevant Orders    Lipid Panel with Direct LDL reflex          Immunizations and preventive care screenings were discussed with patient today  Appropriate education was printed on patient's after visit summary      Counseling:  · Exercise: the importance of regular exercise/physical activity was discussed  Recommend exercise 3-5 times per week for at least 30 minutes  Return in about 1 year (around 3/10/2022) for Annual physical      Chief Complaint:     Chief Complaint   Patient presents with    Establish Care      History of Present Illness:     Adult Annual Physical   Patient here for a comprehensive physical exam  The patient reports problems - as below  Patient presents today to establish in our office (but [facility] 2021 [facility]  The patient sees Psychiatry for depression, anxiety and OCD  She takes Wellbutrin  mg daily and Prozac 20 mg daily  The patient reports that recently anxiety/depression level has been well-controlled  The patient denies SI/HI  Today's PHQ9 score was 1  Diet and Physical Activity  · Diet/Nutrition: poor diet and consuming 3-5 servings of fruits/vegetables daily  · Exercise: no formal exercise  Depression Screening  PHQ-9 Depression Screening    PHQ-9:   Frequency of the following problems over the past two weeks:      Little interest or pleasure in doing things: 0 - not at all  Feeling down, depressed, or hopeless: 0 - not at all  Trouble falling or staying asleep, or sleeping too much: 0 - not at all  Feeling tired or having little energy: 0 - not at all  Poor appetite or overeatin - several days  Feeling bad about yourself - or that you are a failure or have let yourself or your family down: 0 - not at all  Trouble concentrating on things, such as reading the newspaper or watching television: 0 - not at all  Moving or speaking so slowly that other people could have noticed  Or the opposite - being so fidgety or restless that you have been moving around a lot more than usual: 0 - not at all  Thoughts that you would be better off dead, or of hurting yourself in some way: 0 - not at all  PHQ-2 Score: 0  PHQ-9 Score: 1       General Health  · Sleep: sleeps well and gets more than 8 hours of sleep on average  · Hearing: normal - bilateral   · Vision: goes for regular eye exams and wears contacts  · Dental: regular dental visits  /GYN Health  · Last menstrual period: uncommon - notes that she is on a birth control that makes them infrequent       Review of Systems:     Review of Systems   Constitutional: Negative for chills and fever  HENT: Negative for rhinorrhea and sore throat  Eyes: Negative for visual disturbance  Respiratory: Negative for cough, shortness of breath and wheezing  Cardiovascular: Negative for chest pain, palpitations and leg swelling  Gastrointestinal: Negative for abdominal pain, constipation, diarrhea, nausea and vomiting  Endocrine: Negative for polydipsia and polyuria  Genitourinary: Negative for dysuria and frequency  Musculoskeletal: Negative for arthralgias and myalgias  Skin: Negative for rash  Neurological: Negative for dizziness, syncope and headaches  Hematological: Does not bruise/bleed easily  Psychiatric/Behavioral: Negative for dysphoric mood  The patient is not nervous/anxious  Past Medical History:     Past Medical History:   Diagnosis Date    Anxiety     Migraine     Varicella     childhood    Varicose veins of both lower extremities       Past Surgical History:     Past Surgical History:   Procedure Laterality Date    NO PAST SURGERIES      ME COLONOSCOPY FLX DX W/COLLJ SPEC WHEN PFRMD N/A 7/17/2018    Procedure: COLONOSCOPY;  Surgeon: Anders Martinez MD;  Location: [facility];   Service: Colorectal      Social History:        Social History     Socioeconomic History    Marital status: Single     Spouse name: None    Number of children: None    Years of education: None    Highest education level: None   Occupational History    None   Social Needs    Financial resource strain: None    Food insecurity     Worry: None     Inability: None    Transportation needs     Medical: None     Non-medical: None   Tobacco Use    Smoking status: Never Smoker    Smokeless tobacco: Never Used   Substance and Sexual Activity    Alcohol use: Yes     Frequency: 2-4 times a month     Drinks per session: 1 or 2     Comment: socially    Drug use: Not Currently     Comment: medical marijuana    Sexual activity: Yes     Partners: Male     Birth control/protection: OCP   Lifestyle    Physical activity     Days per week: None     Minutes per session: None    Stress: None   Relationships    Social connections     Talks on phone: None     Gets together: None     Attends [employer]ist service: None     Active member of club or organization: None     Attends meetings of clubs or organizations: None     Relationship status: None    Intimate partner violence     Fear of current or ex partner: None     Emotionally abused: None     Physically abused: None     Forced sexual activity: None   Other Topics Concern    None   Social History Narrative    Uses seat belt    Daily coffee consumption      Family History:     Family History   Problem Relation Age of Onset    Cancer Mother         colon,liver,lung    Depression Mother     Deep vein thrombosis Brother     Thrombophilia Brother     No Known Problems Father     No Known Problems Maternal Grandmother     No Known Problems Maternal Grandfather       Current Medications:     Current Outpatient Medications   Medication Sig Dispense Refill    buPROPion (WELLBUTRIN XL) 300 mg 24 hr tablet       clotrimazole-betamethasone (LOTRISONE) 1-0 05 % cream Apply topically 2 (two) times a day 30 g 0    FLUoxetine (PROzac) 20 mg capsule Take 20 mg by mouth daily      norethindrone-ethinyl estradiol-ferrous fumarate (Zara 24 Fe) 1-20 MG-MCG(24) per tablet Take 1 tablet by mouth daily 90 tablet 3     No current facility-administered medications for this visit         Allergies:     No Known Allergies   Physical Exam:     /80 (BP Location: Left arm, Patient Position: Sitting, Cuff Size: Large)   Pulse (!) 106 Temp 98 7 °F (37 1 °C) (Temporal)   Resp 18   Ht 5' 11" (1 803 m)   Wt (!) 142 kg (314 lb)   LMP  (LMP Unknown)   SpO2 98%   BMI 43 79 kg/m²     Physical Exam  Vitals signs reviewed  Constitutional:       General: She is not in acute distress  Appearance: Normal appearance  She is well-developed  She is obese  She is not ill-appearing  HENT:      Head: Normocephalic and atraumatic  Right Ear: Tympanic membrane, ear canal and external ear normal       Left Ear: Tympanic membrane, ear canal and external ear normal    Eyes:      Conjunctiva/sclera: Conjunctivae normal       Pupils: Pupils are equal, round, and reactive to light  Neck:      Musculoskeletal: Normal range of motion and neck supple  Thyroid: No thyromegaly  Cardiovascular:      Rate and Rhythm: Normal rate and regular rhythm  Pulses: Normal pulses  Heart sounds: Normal heart sounds  No murmur  Pulmonary:      Effort: Pulmonary effort is normal       Breath sounds: Normal breath sounds  No wheezing, rhonchi or rales  Abdominal:      General: Bowel sounds are normal  There is no distension  Palpations: Abdomen is soft  There is no mass  Tenderness: There is no abdominal tenderness  Lymphadenopathy:      Cervical: No cervical adenopathy  Skin:     General: Skin is warm and dry  Findings: No rash  Neurological:      Mental Status: She is alert  Sensory: No sensory deficit  Comments: 5/5 strength in UE and LE   Psychiatric:         Mood and Affect: Mood normal          Behavior: Behavior normal          Thought Content:  Thought content normal          Judgment: Judgment normal           Bhavin Cerna PA-C   [facility]

## 2021-03-10 ENCOUNTER — OFFICE VISIT (OUTPATIENT)
Dept: FAMILY MEDICINE CLINIC | Facility: CLINIC | Age: 36
End: 2021-03-10
Payer: COMMERCIAL

## 2021-03-10 VITALS
HEIGHT: 71 IN | BODY MASS INDEX: 41.02 KG/M2 | SYSTOLIC BLOOD PRESSURE: 122 MMHG | WEIGHT: 293 LBS | HEART RATE: 106 BPM | TEMPERATURE: 98.7 F | DIASTOLIC BLOOD PRESSURE: 80 MMHG | RESPIRATION RATE: 18 BRPM | OXYGEN SATURATION: 98 %

## 2021-03-10 DIAGNOSIS — Z00.00 ANNUAL PHYSICAL EXAM: Primary | ICD-10-CM

## 2021-03-10 DIAGNOSIS — Z13.1 DIABETES MELLITUS SCREENING: ICD-10-CM

## 2021-03-10 DIAGNOSIS — F33.0 RECURRENT DEPRESSIVE DISORDER, CURRENT EPISODE MILD (HCC): ICD-10-CM

## 2021-03-10 DIAGNOSIS — E66.01 MORBID OBESITY (HCC): ICD-10-CM

## 2021-03-10 DIAGNOSIS — Z13.220 LIPID SCREENING: ICD-10-CM

## 2021-03-10 PROCEDURE — 3008F BODY MASS INDEX DOCD: CPT | Performed by: PHYSICIAN ASSISTANT

## 2021-03-10 PROCEDURE — 3725F SCREEN DEPRESSION PERFORMED: CPT | Performed by: PHYSICIAN ASSISTANT

## 2021-03-10 PROCEDURE — 99385 PREV VISIT NEW AGE 18-39: CPT | Performed by: PHYSICIAN ASSISTANT

## 2021-03-10 PROCEDURE — 1036F TOBACCO NON-USER: CPT | Performed by: PHYSICIAN ASSISTANT

## 2021-03-10 RX ORDER — BUPROPION HYDROCHLORIDE 300 MG/1
TABLET ORAL
COMMUNITY
Start: 2021-03-09

## 2021-03-10 NOTE — PATIENT INSTRUCTIONS

## 2021-03-11 PROBLEM — E66.01 MORBID OBESITY (HCC): Status: ACTIVE | Noted: 2017-10-25

## 2021-03-11 NOTE — ASSESSMENT & PLAN NOTE
Currently on bupropion  mg daily and fluoxetine 20 mg daily  Patient will continue with Psychiatry as directed  Will continue to monitor  Depression Screening Follow-up Plan: Patient's depression screening was positive with a PHQ-2 score of 0  Their PHQ-9 score was 1  Continue regular follow-up with their psychologist/therapist/psychiatrist who is managing their mental health condition(s)

## 2021-03-11 NOTE — ASSESSMENT & PLAN NOTE
BMI Counseling: Body mass index is 43 79 kg/m²  The BMI is above normal  Nutrition recommendations include decreasing overall calorie intake  Exercise recommendations include moderate aerobic physical activity for 150 minutes/week and exercising 3-5 times per week      She declined referral to nutritionist

## 2021-03-26 DIAGNOSIS — Z23 ENCOUNTER FOR IMMUNIZATION: ICD-10-CM

## 2021-06-02 ENCOUNTER — OFFICE VISIT (OUTPATIENT)
Dept: URGENT CARE | Age: 36
End: 2021-06-02
Payer: COMMERCIAL

## 2021-06-02 VITALS
RESPIRATION RATE: 18 BRPM | TEMPERATURE: 98.1 F | DIASTOLIC BLOOD PRESSURE: 72 MMHG | OXYGEN SATURATION: 98 % | HEART RATE: 95 BPM | SYSTOLIC BLOOD PRESSURE: 148 MMHG

## 2021-06-02 DIAGNOSIS — R10.84 GENERALIZED ABDOMINAL PAIN: Primary | ICD-10-CM

## 2021-06-02 DIAGNOSIS — N30.01 ACUTE CYSTITIS WITH HEMATURIA: ICD-10-CM

## 2021-06-02 LAB
SL AMB  POCT GLUCOSE, UA: NEGATIVE
SL AMB LEUKOCYTE ESTERASE,UA: ABNORMAL
SL AMB POCT BILIRUBIN,UA: NEGATIVE
SL AMB POCT BLOOD,UA: ABNORMAL
SL AMB POCT CLARITY,UA: ABNORMAL
SL AMB POCT COLOR,UA: YELLOW
SL AMB POCT KETONES,UA: NEGATIVE
SL AMB POCT NITRITE,UA: NEGATIVE
SL AMB POCT PH,UA: 6.5
SL AMB POCT SPECIFIC GRAVITY,UA: 1.01
SL AMB POCT URINE PROTEIN: ABNORMAL
SL AMB POCT UROBILINOGEN: 0.2

## 2021-06-02 PROCEDURE — 87186 SC STD MICRODIL/AGAR DIL: CPT | Performed by: PHYSICIAN ASSISTANT

## 2021-06-02 PROCEDURE — 87086 URINE CULTURE/COLONY COUNT: CPT | Performed by: PHYSICIAN ASSISTANT

## 2021-06-02 PROCEDURE — S9083 URGENT CARE CENTER GLOBAL: HCPCS | Performed by: PHYSICIAN ASSISTANT

## 2021-06-02 PROCEDURE — 81002 URINALYSIS NONAUTO W/O SCOPE: CPT | Performed by: PHYSICIAN ASSISTANT

## 2021-06-02 PROCEDURE — 87077 CULTURE AEROBIC IDENTIFY: CPT | Performed by: PHYSICIAN ASSISTANT

## 2021-06-02 PROCEDURE — G0382 LEV 3 HOSP TYPE B ED VISIT: HCPCS | Performed by: PHYSICIAN ASSISTANT

## 2021-06-02 RX ORDER — NITROFURANTOIN 25; 75 MG/1; MG/1
100 CAPSULE ORAL 2 TIMES DAILY
Qty: 10 CAPSULE | Refills: 0 | Status: SHIPPED | OUTPATIENT
Start: 2021-06-02 | End: 2021-06-07

## 2021-06-02 NOTE — PROGRESS NOTES
[facility]        NAME: Mere Lopez is a 37 y o  female  : 1985    MRN: [id number]  DATE: 2021  TIME: 8:15 AM    Assessment and Plan   Generalized abdominal pain [R10 84]  1  Generalized abdominal pain  POCT urine dip   2  Acute cystitis with hematuria  Urine culture    nitrofurantoin (MACROBID) 100 mg capsule         Patient Instructions       Follow up with PCP in 3-5 days  Proceed to  ER if symptoms worsen  Chief Complaint     Chief Complaint   Patient presents with    Possible UTI         History of Present Illness       Patient presents with urinary frequency, urgency, dysuria since Saturday  She denies any fever, chills, nausea, vomiting  She denies any flank pain  She denies any history of recurrent UTIs  Review of Systems   Review of Systems   Constitutional: Negative  HENT: Negative  Respiratory: Negative  Cardiovascular: Negative  Gastrointestinal: Negative  Genitourinary: Positive for dysuria, frequency and urgency  Negative for flank pain  Musculoskeletal: Negative  Neurological: Negative  Psychiatric/Behavioral: Negative            Current Medications       Current Outpatient Medications:     buPROPion (WELLBUTRIN XL) 300 mg 24 hr tablet, , Disp: , Rfl:     clotrimazole-betamethasone (LOTRISONE) 1-0 05 % cream, Apply topically 2 (two) times a day, Disp: 30 g, Rfl: 0    FLUoxetine (PROzac) 20 mg capsule, Take 20 mg by mouth daily, Disp: , Rfl:     nitrofurantoin (MACROBID) 100 mg capsule, Take 1 capsule (100 mg total) by mouth 2 (two) times a day for 5 days, Disp: 10 capsule, Rfl: 0    norethindrone-ethinyl estradiol-ferrous fumarate (Zara 24 Fe) 1-20 MG-MCG(24) per tablet, Take 1 tablet by mouth daily, Disp: 90 tablet, Rfl: 3    Current Allergies     Allergies as of 2021    (No Known Allergies)            The following portions of the patient's history were reviewed and updated as appropriate: allergies, current medications, past family history, past medical history, past social history, past surgical history and problem list      Past Medical History:   Diagnosis Date    Anxiety     Migraine     Varicella     childhood    Varicose veins of both lower extremities        Past Surgical History:   Procedure Laterality Date    NO PAST SURGERIES      OH COLONOSCOPY FLX DX W/COLLJ SPEC WHEN PFRMD N/A 7/17/2018    Procedure: COLONOSCOPY;  Surgeon: Ai Ferrara MD;  Location:  GI LAB; Service: Colorectal       Family History   Problem Relation Age of Onset    Cancer Mother         colon,liver,lung    Depression Mother     Deep vein thrombosis Brother     Thrombophilia Brother     No Known Problems Father     No Known Problems Maternal Grandmother     No Known Problems Maternal Grandfather          Medications have been verified  Objective   /72   Pulse 95   Temp 98 1 °F (36 7 °C)   Resp 18   LMP 05/21/2018 Comment: absent period >3yrs, d/t bcp  SpO2 98%        Physical Exam     Physical Exam  Vitals signs and nursing note reviewed  Constitutional:       General: She is not in acute distress  Appearance: She is well-developed  She is not diaphoretic  HENT:      Head: Normocephalic and atraumatic  Pulmonary:      Effort: Pulmonary effort is normal    Abdominal:      General: Abdomen is flat  Tenderness: There is no abdominal tenderness  There is no right CVA tenderness, left CVA tenderness, guarding or rebound  Musculoskeletal: Normal range of motion  Skin:     General: Skin is warm and dry  Neurological:      Mental Status: She is alert and oriented to person, place, and time     Psychiatric:         Behavior: Behavior normal

## 2021-06-02 NOTE — PATIENT INSTRUCTIONS

## 2021-06-04 LAB — BACTERIA UR CULT: ABNORMAL

## 2022-02-16 ENCOUNTER — ANNUAL EXAM (OUTPATIENT)
Dept: OBGYN CLINIC | Facility: CLINIC | Age: 37
End: 2022-02-16
Payer: COMMERCIAL

## 2022-02-16 VITALS — DIASTOLIC BLOOD PRESSURE: 72 MMHG | WEIGHT: 293 LBS | SYSTOLIC BLOOD PRESSURE: 130 MMHG | BODY MASS INDEX: 44.83 KG/M2

## 2022-02-16 DIAGNOSIS — N94.10 DYSPAREUNIA IN FEMALE: ICD-10-CM

## 2022-02-16 DIAGNOSIS — Z80.0 FAMILY HISTORY OF COLON CANCER IN MOTHER: ICD-10-CM

## 2022-02-16 DIAGNOSIS — Z30.41 ENCOUNTER FOR SURVEILLANCE OF CONTRACEPTIVE PILLS: ICD-10-CM

## 2022-02-16 DIAGNOSIS — Z01.419 ENCOUNTER FOR GYNECOLOGICAL EXAMINATION (GENERAL) (ROUTINE) WITHOUT ABNORMAL FINDINGS: Primary | ICD-10-CM

## 2022-02-16 PROCEDURE — S0612 ANNUAL GYNECOLOGICAL EXAMINA: HCPCS | Performed by: NURSE PRACTITIONER

## 2022-02-16 RX ORDER — NORETHINDRONE ACETATE AND ETHINYL ESTRADIOL, AND FERROUS FUMARATE 1MG-20(24)
1 KIT ORAL DAILY
Qty: 84 TABLET | Refills: 3 | Status: SHIPPED | OUTPATIENT
Start: 2022-02-16

## 2022-02-16 NOTE — PROGRESS NOTES
Encounter for gynecological examination (general) (routine) without abnormal findings    Normal findings on routine gyn exam  Advised monthly SBE, annual CBE and baseline screening mammo at age 38  Reviewed ASCCP guidelines and recommended pap with cotesting q3 yrs for this low risk patient-up to date  STI testing was offered and the patient declines; she reports low risk  The patient desires to continue current contraceptive method  Diet/activity recommendations:  Encouraged daily Ca++ and vitamin D intake as well as daily weight bearing exercise for promotion of bone health  RTO in one year or sooner PRN  Encounter for surveillance of contraceptive pills    Happy with current OCP  Denies ACHES, breakthrough bleeding, nausea or other side effects  Amenorrheic on same  Refill given for one year  RTO 1 year or prn problems or concerns  Family history of colon cancer in mother  Mom colon cancer @ 52  Gets colonoscopy q 5 years  Offered cancer risk assessment-declines  Dyspareunia in female  Exam c/w scar tissue from delivery  Advised perineal massage/dilators  PT offered but declines today  Will call if desires in future  Diagnoses and all orders for this visit:    Encounter for gynecological examination (general) (routine) without abnormal findings    Encounter for surveillance of contraceptive pills  -     norethindrone-ethinyl estradiol-ferrous fumarate (Zara 24 Fe) 1-20 MG-MCG(24) per tablet; Take 1 tablet by mouth daily    Family history of colon cancer in mother    Dyspareunia in female         Logan Boucher  1985      CC:  Yearly exam     S:  Logan Boucher is a 31 y o  female here for yearly exam  She denies breast concerns, abdominal/pelvic pain, abnormal vaginal discharge or bladder/bowel dysfunction  She is amenorrheic on OCP  She continues to have pain @ introitus with spotting with intercourse in area of perineal laceration with prior delivery    Pain is better with position changes  Contraception: She uses OCP  for contraception  STD testing:  She does not want STD testing today  Last Pap 2/20 neg/neg   Last Colonoscopy q 5 years-due 2023  SBE yes     Family hx of breast cancer: denies  Family hx of ovarian cancer:denies  Family hx of colon cancer: mom @ 52      She is a special     Has 1year old daughter       Current Outpatient Medications:     buPROPion (WELLBUTRIN XL) 300 mg 24 hr tablet, , Disp: , Rfl:     FLUoxetine (PROzac) 20 mg capsule, Take 20 mg by mouth daily, Disp: , Rfl:     norethindrone-ethinyl estradiol-ferrous fumarate (Zara 24 Fe) 1-20 MG-MCG(24) per tablet, Take 1 tablet by mouth daily, Disp: 84 tablet, Rfl: 3  Social History     Socioeconomic History    Marital status: Single     Spouse name: Not on file    Number of children: Not on file    Years of education: Not on file    Highest education level: Not on file   Occupational History    Not on file   Tobacco Use    Smoking status: Never Smoker    Smokeless tobacco: Never Used   Vaping Use    Vaping Use: Never used   Substance and Sexual Activity    Alcohol use: Yes     Comment: socially    Drug use: Not Currently     Comment: medical marijuana    Sexual activity: Yes     Partners: Male     Birth control/protection: OCP   Other Topics Concern    Not on file   Social History Narrative    Uses seat belt    Daily coffee consumption     Social Determinants of Health     Financial Resource Strain: Not on file   Food Insecurity: Not on file   Transportation Needs: Not on file   Physical Activity: Not on file   Stress: Not on file   Social Connections: Not on file   Intimate Partner Violence: Not on file   Housing Stability: Not on file     Family History   Problem Relation Age of Onset    Cancer Mother         colon,liver,lung    Depression Mother     Deep vein thrombosis Brother     Thrombophilia Brother     No Known Problems Father     No Known Problems Maternal Grandmother     No Known Problems Maternal Grandfather       Past Medical History:   Diagnosis Date    Anxiety     Migraine     Varicella     childhood    Varicose veins of both lower extremities         Review of Systems   Constitutional: Negative for appetite change, fatigue and unexpected weight change  Respiratory: Negative for shortness of breath  Cardiovascular: Negative for chest pain and leg swelling  Gastrointestinal: Negative for abdominal pain  Endocrine: Negative for cold intolerance and heat intolerance  Breasts:  Negative for tenderness, pain or masses  Genitourinary: Negative for dysuria, flank pain, frequency, genital sores, hematuria, menstrual problem and pelvic pain  + for dyspareunia  Musculoskeletal: Negative for back pain  Neurological: Negative for headaches  O:  Blood pressure 130/72, weight (!) 146 kg (321 lb 6 4 oz), not currently breastfeeding  Patient appears well and is not in distress  ROM normal   Neck is supple without masses  Normal thyroid  Heart regular rate and rhythm  Lungs CTA bilaterally   Breasts are symmetrical without mass, tenderness, nipple discharge, skin changes or adenopathy  Abdomen is soft and nontender without masses  External genitals are normal without lesions or rashes  Urethral meatus and urethra are normal  Bladder is normal to palpation  Vagina is normal without discharge or bleeding    + tenderness @ introitus on deep palpation   Cervix is normal without discharge or lesion  Uterus is normal, mobile, nontender without palpable mass but exam limited by body habitus  Adnexa are normal, nontender, without palpable mass but exam limited by body habitus     Skin warm and dry   Capillary refill < 2 seconds  Alert and oriented x 3 with normal affect

## 2022-02-16 NOTE — ASSESSMENT & PLAN NOTE
Happy with current OCP  Denies ACHES, breakthrough bleeding, nausea or other side effects  Amenorrheic on same  Refill given for one year  RTO 1 year or prn problems or concerns 
 Normal findings on routine gyn exam  Advised monthly SBE, annual CBE and baseline screening mammo at age 38  Reviewed ASCCP guidelines and recommended pap with cotesting q3 yrs for this low risk patient-up to date  STI testing was offered and the patient declines; she reports low risk  The patient desires to continue current contraceptive method  Diet/activity recommendations:  Encouraged daily Ca++ and vitamin D intake as well as daily weight bearing exercise for promotion of bone health  RTO in one year or sooner PRN 
Exam c/w scar tissue from delivery  Advised perineal massage/dilators  PT offered but declines today  Will call if desires in future 
Mom colon cancer @ 52  Gets colonoscopy q 5 years  Offered cancer risk assessment-declines 
normal...

## 2022-03-03 ENCOUNTER — RA CDI HCC (OUTPATIENT)
Dept: OTHER | Facility: HOSPITAL | Age: 37
End: 2022-03-03

## 2022-03-03 NOTE — PROGRESS NOTES
[place] [id number]  coding opportunities      Chart Reviewed * (Number of) Inbasket suggestions sent to Provider: 2            Patients insurance company: [id number] (Medicare Advantage and Commercial)         Appt on 3/1/22    Found in active problem list - please review and assess if applicable for 8174    1) E66 01: Morbid obesity ([place] [id number] )      2) F33 0: Recurrent depressive disorder, current episode mild ([place] [id number] )

## 2022-03-07 LAB
ALBUMIN SERPL-MCNC: 4.1 G/DL (ref 3.6–5.1)
ALBUMIN/GLOB SERPL: 1.6 (CALC) (ref 1–2.5)
ALP SERPL-CCNC: 51 U/L (ref 31–125)
ALT SERPL-CCNC: 16 U/L (ref 6–29)
AST SERPL-CCNC: 15 U/L (ref 10–30)
BASOPHILS # BLD AUTO: 57 CELLS/UL (ref 0–200)
BASOPHILS NFR BLD AUTO: 0.7 %
BILIRUB SERPL-MCNC: 0.3 MG/DL (ref 0.2–1.2)
BUN SERPL-MCNC: 11 MG/DL (ref 7–25)
BUN/CREAT SERPL: NORMAL (CALC) (ref 6–22)
CALCIUM SERPL-MCNC: 9.3 MG/DL (ref 8.6–10.2)
CHLORIDE SERPL-SCNC: 102 MMOL/L (ref 98–110)
CHOLEST SERPL-MCNC: 172 MG/DL
CHOLEST/HDLC SERPL: 3.1 (CALC)
CO2 SERPL-SCNC: 28 MMOL/L (ref 20–32)
CREAT SERPL-MCNC: 0.91 MG/DL (ref 0.5–1.1)
EOSINOPHIL # BLD AUTO: 348 CELLS/UL (ref 15–500)
EOSINOPHIL NFR BLD AUTO: 4.3 %
ERYTHROCYTE [DISTWIDTH] IN BLOOD BY AUTOMATED COUNT: 12.4 % (ref 11–15)
GLOBULIN SER CALC-MCNC: 2.6 G/DL (CALC) (ref 1.9–3.7)
GLUCOSE SERPL-MCNC: 91 MG/DL (ref 65–99)
HCT VFR BLD AUTO: 44.3 % (ref 35–45)
HDLC SERPL-MCNC: 56 MG/DL
HGB BLD-MCNC: 14.6 G/DL (ref 11.7–15.5)
LDLC SERPL CALC-MCNC: 97 MG/DL (CALC)
LYMPHOCYTES # BLD AUTO: 2924 CELLS/UL (ref 850–3900)
LYMPHOCYTES NFR BLD AUTO: 36.1 %
MCH RBC QN AUTO: 29.6 PG (ref 27–33)
MCHC RBC AUTO-ENTMCNC: 33 G/DL (ref 32–36)
MCV RBC AUTO: 89.9 FL (ref 80–100)
MONOCYTES # BLD AUTO: 632 CELLS/UL (ref 200–950)
MONOCYTES NFR BLD AUTO: 7.8 %
NEUTROPHILS # BLD AUTO: 4139 CELLS/UL (ref 1500–7800)
NEUTROPHILS NFR BLD AUTO: 51.1 %
NONHDLC SERPL-MCNC: 116 MG/DL (CALC)
PLATELET # BLD AUTO: 317 THOUSAND/UL (ref 140–400)
PMV BLD REES-ECKER: 10.1 FL (ref 7.5–12.5)
POTASSIUM SERPL-SCNC: 4.3 MMOL/L (ref 3.5–5.3)
PROT SERPL-MCNC: 6.7 G/DL (ref 6.1–8.1)
RBC # BLD AUTO: 4.93 MILLION/UL (ref 3.8–5.1)
SL AMB EGFR AFRICAN AMERICAN: 95 ML/MIN/1.73M2
SL AMB EGFR NON AFRICAN AMERICAN: 82 ML/MIN/1.73M2
SODIUM SERPL-SCNC: 139 MMOL/L (ref 135–146)
TRIGL SERPL-MCNC: 101 MG/DL
TSH SERPL-ACNC: 2.42 MIU/L
WBC # BLD AUTO: 8.1 THOUSAND/UL (ref 3.8–10.8)

## 2022-03-10 NOTE — PROGRESS NOTES
[facility]    NAME: Albin Grier  AGE: 31 y o  SEX: female  : 1985     DATE: 3/13/2022     Assessment and Plan:     Problem List Items Addressed This Visit        Other    Morbid obesity ([address] )     BMI Counseling: Body mass index is 45 05 kg/m²  The BMI is above normal  Nutrition recommendations include 3-5 servings of fruits/vegetables daily  Exercise recommendations include moderate aerobic physical activity for 150 minutes/week and exercising 3-5 times per week  Relevant Orders    CBC and differential    Comprehensive metabolic panel    Lipid Panel with Direct LDL reflex    TSH, 3rd generation with Free T4 reflex    Recurrent depressive disorder, current episode mild ([facility] [address] )     Well controlled  Continue bupropion  mg daily and fluoxetine 20 mg daily through Psychiatry  Will continue to monitor  Depression Screening Follow-up Plan: Patient's depression screening was positive with a PHQ-2 score of   Their PHQ-9 score was 4  Continue regular follow-up with their psychologist/therapist/psychiatrist who is managing their mental health condition(s)  Other Visit Diagnoses     Annual physical exam    -  Primary    Relevant Orders    CBC and differential    Comprehensive metabolic panel    Lipid Panel with Direct LDL reflex    TSH, 3rd generation with Free T4 reflex    Hepatitis C antibody    Need for hepatitis C screening test        Relevant Orders    Hepatitis C antibody    Lipid screening        Relevant Orders    Lipid Panel with Direct LDL reflex    Diabetes mellitus screening        Relevant Orders    Comprehensive metabolic panel          Immunizations and preventive care screenings were discussed with patient today  Appropriate education was printed on patient's after visit summary  Counseling:  · Exercise: the importance of regular exercise/physical activity was discussed   Recommend exercise 3-5 times per week for at least 30 minutes  Return in about 1 year (around 3/11/2023) for Annual physical      Chief Complaint:     Chief Complaint   Patient presents with    Physical Exam      History of Present Illness:     Adult Annual Physical   Patient here for a comprehensive physical exam  The patient reports problems - as below  The patient reports that recently depression level has been well-controlled  Daily medication includes bupropion  mg daily and fluoxetine 20 mg daily  The patient denies panic attacks  The patient denies SI/HI  Today's PHQ9 score was 4  Diet and Physical Activity  · Diet/Nutrition: poor diet, frequent junk food and consuming 3-5 servings of fruits/vegetables daily  · Exercise: walking  Depression Screening  PHQ-2/9 Depression Screening    Little interest or pleasure in doing things: 1 - several days  Feeling down, depressed, or hopeless: 0 - not at all  Trouble falling or staying asleep, or sleeping too much: 1 - several days  Feeling tired or having little energy: 1 - several days  Poor appetite or overeatin - several days  Feeling bad about yourself - or that you are a failure or have let yourself or your family down: 0 - not at all  Trouble concentrating on things, such as reading the newspaper or watching television: 0 - not at all  Moving or speaking so slowly that other people could have noticed  Or the opposite - being so fidgety or restless that you have been moving around a lot more than usual: 0 - not at all  Thoughts that you would be better off dead, or of hurting yourself in some way: 0 - not at all  PHQ-9 Score: 4   PHQ-9 Interpretation: No or Minimal depression        General Health  · Sleep: sleeps well and averages 8 hours a night  · Hearing: normal - bilateral   · Vision: most recent eye exam >1 year ago and wears contacts  · Dental: regular dental visits         /GYN Health  · Last menstrual period: not in years - notes that she has OCP and does not get it anymore       Review of Systems:     Review of Systems   Constitutional: Negative for chills and fever  HENT: Negative for congestion, rhinorrhea and sore throat  Eyes: Negative for visual disturbance  Respiratory: Negative for cough, shortness of breath and wheezing  Cardiovascular: Negative for chest pain, palpitations and leg swelling  Gastrointestinal: Negative for abdominal pain, constipation, diarrhea, nausea and vomiting  Endocrine: Negative for polydipsia and polyuria  Genitourinary: Negative for dysuria and frequency  Musculoskeletal: Negative for arthralgias, myalgias and neck pain (resolved with chiro)  Skin: Negative for rash  Neurological: Negative for dizziness, syncope and headaches  Hematological: Does not bruise/bleed easily  Psychiatric/Behavioral: Negative for dysphoric mood and suicidal ideas  The patient is nervous/anxious  Past Medical History:     Past Medical History:   Diagnosis Date    Anxiety     Migraine     Varicella     childhood    Varicose veins of both lower extremities       Past Surgical History:     Past Surgical History:   Procedure Laterality Date    NO PAST SURGERIES      MA COLONOSCOPY FLX DX W/COLLJ SPEC WHEN PFRMD N/A 7/17/2018    Procedure: COLONOSCOPY;  Surgeon: Compa Oquendo MD;  Location: [facility];   Service: Colorectal      Social History:     Social History     Socioeconomic History    Marital status: Single     Spouse name: None    Number of children: None    Years of education: None    Highest education level: None   Occupational History    None   Tobacco Use    Smoking status: Never Smoker    Smokeless tobacco: Never Used   Vaping Use    Vaping Use: Never used   Substance and Sexual Activity    Alcohol use: Yes     Comment: Social - about 1 beer a month    Drug use: Yes     Types: Marijuana     Comment: medical marijuana    Sexual activity: Yes     Partners: Male     Birth control/protection: OCP   Other Topics Concern    None   Social History Narrative    Uses seat belt    Daily coffee consumption     Social Determinants of Health     Financial Resource Strain: Not on file   Food Insecurity: Not on file   Transportation Needs: Not on file   Physical Activity: Not on file   Stress: Not on file   Social Connections: Not on file   Intimate Partner Violence: Not on file   Housing Stability: Not on file      Family History:     Family History   Problem Relation Age of Onset    Cancer Mother         colon,liver,lung    Depression Mother     No Known Problems Father     Deep vein thrombosis Brother     Thrombophilia Brother     No Known Problems Maternal Grandmother     No Known Problems Maternal Grandfather     Ulcerative colitis Maternal Aunt     Ulcerative colitis Maternal Uncle     Crohn's disease Maternal Uncle       Current Medications:     Current Outpatient Medications   Medication Sig Dispense Refill    buPROPion (WELLBUTRIN XL) 300 mg 24 hr tablet       Cetirizine HCl (ZYRTEC ALLERGY PO) Take by mouth daily      FLUoxetine (PROzac) 20 mg capsule Take 20 mg by mouth daily      multivitamin (THERAGRAN) TABS Take 1 tablet by mouth daily      norethindrone-ethinyl estradiol-ferrous fumarate (Zara 24 Fe) 1-20 MG-MCG(24) per tablet Take 1 tablet by mouth daily 84 tablet 3     No current facility-administered medications for this visit  Allergies:     No Known Allergies   Physical Exam:     /74 (BP Location: Left arm, Patient Position: Sitting, Cuff Size: Large)   Pulse (!) 106   Wt (!) 147 kg (323 lb)   LMP  (LMP Unknown)   SpO2 98%   BMI 45 05 kg/m²     Physical Exam  Vitals reviewed  Constitutional:       General: She is not in acute distress  Appearance: Normal appearance  She is well-developed  She is obese  She is not ill-appearing  HENT:      Head: Normocephalic and atraumatic        Right Ear: Ear canal and external ear normal  There is impacted cerumen  Left Ear: Ear canal and external ear normal  There is impacted cerumen  Nose: Nose normal    Eyes:      Conjunctiva/sclera: Conjunctivae normal       Pupils: Pupils are equal, round, and reactive to light  Neck:      Thyroid: No thyromegaly  Cardiovascular:      Rate and Rhythm: Normal rate and regular rhythm  Pulses: Normal pulses  Heart sounds: Normal heart sounds  No murmur heard  Pulmonary:      Effort: Pulmonary effort is normal       Breath sounds: Normal breath sounds  No wheezing, rhonchi or rales  Abdominal:      General: Bowel sounds are normal  There is no distension  Palpations: Abdomen is soft  There is no mass  Tenderness: There is no abdominal tenderness  Musculoskeletal:      Cervical back: Normal range of motion and neck supple  Right lower leg: No edema  Left lower leg: No edema  Lymphadenopathy:      Cervical: No cervical adenopathy  Skin:     General: Skin is warm and dry  Findings: No rash  Neurological:      Mental Status: She is alert  Sensory: No sensory deficit  Comments: 5/5 strength in UE and LE   Psychiatric:         Mood and Affect: Mood normal          Behavior: Behavior normal          Thought Content:  Thought content normal          Judgment: Judgment normal           Henna Rai PA-C   [facility]

## 2022-03-11 ENCOUNTER — OFFICE VISIT (OUTPATIENT)
Dept: FAMILY MEDICINE CLINIC | Facility: CLINIC | Age: 37
End: 2022-03-11
Payer: COMMERCIAL

## 2022-03-11 VITALS
OXYGEN SATURATION: 98 % | WEIGHT: 293 LBS | SYSTOLIC BLOOD PRESSURE: 122 MMHG | BODY MASS INDEX: 45.05 KG/M2 | DIASTOLIC BLOOD PRESSURE: 74 MMHG | HEART RATE: 106 BPM

## 2022-03-11 DIAGNOSIS — Z00.00 ANNUAL PHYSICAL EXAM: Primary | ICD-10-CM

## 2022-03-11 DIAGNOSIS — Z11.59 NEED FOR HEPATITIS C SCREENING TEST: ICD-10-CM

## 2022-03-11 DIAGNOSIS — Z13.220 LIPID SCREENING: ICD-10-CM

## 2022-03-11 DIAGNOSIS — F33.0 RECURRENT DEPRESSIVE DISORDER, CURRENT EPISODE MILD (HCC): ICD-10-CM

## 2022-03-11 DIAGNOSIS — Z13.1 DIABETES MELLITUS SCREENING: ICD-10-CM

## 2022-03-11 DIAGNOSIS — E66.01 MORBID OBESITY (HCC): ICD-10-CM

## 2022-03-11 PROCEDURE — 1036F TOBACCO NON-USER: CPT | Performed by: PHYSICIAN ASSISTANT

## 2022-03-11 PROCEDURE — 3725F SCREEN DEPRESSION PERFORMED: CPT | Performed by: PHYSICIAN ASSISTANT

## 2022-03-11 PROCEDURE — 99395 PREV VISIT EST AGE 18-39: CPT | Performed by: PHYSICIAN ASSISTANT

## 2022-03-11 RX ORDER — DIPHENOXYLATE HYDROCHLORIDE AND ATROPINE SULFATE 2.5; .025 MG/1; MG/1
1 TABLET ORAL DAILY
COMMUNITY

## 2022-03-11 NOTE — PATIENT INSTRUCTIONS

## 2022-03-13 NOTE — ASSESSMENT & PLAN NOTE
Well controlled  Continue bupropion  mg daily and fluoxetine 20 mg daily through Psychiatry  Will continue to monitor  Depression Screening Follow-up Plan: Patient's depression screening was positive with a PHQ-2 score of   Their PHQ-9 score was 4  Continue regular follow-up with their psychologist/therapist/psychiatrist who is managing their mental health condition(s)

## 2022-03-13 NOTE — ASSESSMENT & PLAN NOTE
BMI Counseling: Body mass index is 45 05 kg/m²  The BMI is above normal  Nutrition recommendations include 3-5 servings of fruits/vegetables daily  Exercise recommendations include moderate aerobic physical activity for 150 minutes/week and exercising 3-5 times per week

## 2022-03-30 ENCOUNTER — OFFICE VISIT (OUTPATIENT)
Dept: FAMILY MEDICINE CLINIC | Facility: CLINIC | Age: 37
End: 2022-03-30
Payer: COMMERCIAL

## 2022-03-30 VITALS
SYSTOLIC BLOOD PRESSURE: 128 MMHG | HEIGHT: 71 IN | HEART RATE: 95 BPM | OXYGEN SATURATION: 98 % | DIASTOLIC BLOOD PRESSURE: 92 MMHG | WEIGHT: 293 LBS | BODY MASS INDEX: 41.02 KG/M2

## 2022-03-30 DIAGNOSIS — J30.9 ALLERGIC RHINITIS, UNSPECIFIED SEASONALITY, UNSPECIFIED TRIGGER: ICD-10-CM

## 2022-03-30 DIAGNOSIS — H61.23 BILATERAL IMPACTED CERUMEN: Primary | ICD-10-CM

## 2022-03-30 PROCEDURE — 69209 REMOVE IMPACTED EAR WAX UNI: CPT | Performed by: PHYSICIAN ASSISTANT

## 2022-03-30 PROCEDURE — 3008F BODY MASS INDEX DOCD: CPT | Performed by: PHYSICIAN ASSISTANT

## 2022-03-30 PROCEDURE — 99213 OFFICE O/P EST LOW 20 MIN: CPT | Performed by: PHYSICIAN ASSISTANT

## 2022-03-30 RX ORDER — FLUTICASONE PROPIONATE 50 MCG
2 SPRAY, SUSPENSION (ML) NASAL DAILY
Qty: 16 G | Refills: 2 | Status: SHIPPED | OUTPATIENT
Start: 2022-03-30

## 2022-03-30 NOTE — PROGRESS NOTES
FAMILY PRACTICE ACUTE OFFICE VISIT  [facility] PRIMARY [facility]ARE       NAME: Dominic Mederos  AGE: 31 y o  SEX: female       : 1985        MRN: [id number]    DATE: 3/30/2022  TIME: 10:41 AM    Assessment and Plan     Problem List Items Addressed This Visit     None      Visit Diagnoses     Bilateral impacted cerumen    -  Primary    Removed successfully bilaterally  Call if symptoms recur  Allergic rhinitis, unspecified seasonality, unspecified trigger        Given Flonase to try 2 sprays daily  Call if congestion fails to improve  Relevant Medications    fluticasone (FLONASE) 50 mcg/act nasal spray              Chief Complaint     Chief Complaint   Patient presents with    Earache     Bilateral       History of Present Illness   Dominic Mederos is a 31y o -year-old female who presents for bilateral ear pain  Tested negative for COVID at home x2     Earache   There is pain in both (started on the right 9 days ago and now both) ears  This is a new problem  Episode onset: about 1 week ago  Episode frequency: intermittent  The problem has been unchanged  There has been no fever  Associated symptoms include hearing loss (muffled a few days) and rhinorrhea (and congested today)  Pertinent negatives include no abdominal pain, coughing, diarrhea, ear discharge, headaches, neck pain, rash, sore throat or vomiting  She has tried ear drops (ibuprofen and Zyrtec, Sufdafed) for the symptoms  The treatment provided no relief  There is no history of a chronic ear infection or a tympanostomy tube  Review of Systems   Review of Systems   Constitutional: Positive for fatigue  Negative for chills and fever  HENT: Positive for ear pain, hearing loss (muffled a few days) and rhinorrhea (and congested today)  Negative for ear discharge and sore throat  Respiratory: Negative for cough, shortness of breath and wheezing      Gastrointestinal: Negative for abdominal pain, diarrhea, nausea and vomiting  Musculoskeletal: Negative for neck pain  Skin: Negative for rash  Neurological: Negative for headaches         Active Problem List     Patient Active Problem List   Diagnosis    Morbid obesity ([address] )    Generalized anxiety disorder    Recurrent depressive disorder, current episode mild (HCC)    OCD (obsessive compulsive disorder)    Change in vision    Encounter for surveillance of contraceptive pills    Dermatitis    Encounter for gynecological examination (general) (routine) without abnormal findings    Family history of colon cancer in mother   Ardeth Needs Dyspareunia in female         Social History:  Social History     Socioeconomic History    Marital status: Single     Spouse name: Not on file    Number of children: Not on file    Years of education: Not on file    Highest education level: Not on file   Occupational History    Not on file   Tobacco Use    Smoking status: Never Smoker    Smokeless tobacco: Never Used   Vaping Use    Vaping Use: Never used   Substance and Sexual Activity    Alcohol use: Yes     Comment: Social - about 1 beer a month    Drug use: Yes     Types: Marijuana     Comment: medical marijuana    Sexual activity: Yes     Partners: Male     Birth control/protection: OCP   Other Topics Concern    Not on file   Social History Narrative    Uses seat belt    Daily coffee consumption     Social Determinants of Health     Financial Resource Strain: Not on file   Food Insecurity: Not on file   Transportation Needs: Not on file   Physical Activity: Not on file   Stress: Not on file   Social Connections: Not on file   Intimate Partner Violence: Not on file   Housing Stability: Not on file       Objective     Vitals:    03/30/22 0952 03/30/22 1016   BP: 130/90 128/92   BP Location: Left arm    Patient Position: Sitting    Cuff Size: Large    Pulse: 95    SpO2: 98%    Weight: (!) 147 kg (324 lb)    Height: 5' 11" (1 803 m)      Wt Readings from Last 3 Encounters:   03/30/22 (!) 147 kg (324 lb)   03/11/22 (!) 147 kg (323 lb)   02/16/22 (!) 146 kg (321 lb 6 4 oz)         Physical Exam  Vitals reviewed  Constitutional:       General: She is not in acute distress  Appearance: Normal appearance  She is obese  She is not ill-appearing  HENT:      Head: Normocephalic and atraumatic  Right Ear: Tympanic membrane, ear canal and external ear normal  There is impacted cerumen  Left Ear: Tympanic membrane, ear canal and external ear normal  There is impacted cerumen  Mouth/Throat:      Mouth: Mucous membranes are moist       Pharynx: No oropharyngeal exudate  Cardiovascular:      Rate and Rhythm: Normal rate and regular rhythm  Pulses: Normal pulses  Heart sounds: Normal heart sounds  No murmur heard  Pulmonary:      Effort: Pulmonary effort is normal       Breath sounds: Normal breath sounds  No wheezing, rhonchi or rales  Musculoskeletal:      Right lower leg: No edema  Left lower leg: No edema  Lymphadenopathy:      Cervical: No cervical adenopathy  Neurological:      Mental Status: She is alert  Psychiatric:         Mood and Affect: Mood normal          Behavior: Behavior normal          Thought Content: Thought content normal          Judgment: Judgment normal        Ear cerumen removal    Date/Time: 3/30/2022 10:33 AM  Performed by: Dee Yang PA-C  Authorized by: Dee Yang PA-C   Universal Protocol:  Consent given by: patient  Patient understanding: patient states understanding of the procedure being performed  Patient consent: the patient's understanding of the procedure matches consent given      Patient location:  Clinic  Procedure details:     Local anesthetic:  None    Location:  R ear and L ear    Procedure type: irrigation only      Approach:  Natural orifice  Post-procedure details:     Complication:  None    Hearing quality:  Normal    Patient tolerance of procedure:   Tolerated well, no immediate complications        Pertinent Laboratory/Diagnostic Studies:  Results for orders placed or performed in visit on 03/07/22   Lipid Panel with Direct LDL reflex   Result Value Ref Range    Total Cholesterol 172 <200 mg/dL    HDL 56 > OR = 50 mg/dL    Triglycerides 101 <150 mg/dL    LDL Calculated 97 mg/dL (calc)    Chol HDLC Ratio 3 1 <5 0 (calc)    Non-HDL Cholesterol 116 <130 mg/dL (calc)   Comprehensive metabolic panel   Result Value Ref Range    Glucose, Random 91 65 - 99 mg/dL    BUN 11 7 - 25 mg/dL    Creatinine 0 91 0 50 - 1 10 mg/dL    eGFR Non  82 > OR = 60 mL/min/1 73m2    eGFR  95 > OR = 60 mL/min/1 73m2    SL AMB BUN/CREATININE RATIO NOT APPLICABLE 6 - 22 (calc)    Sodium 139 135 - 146 mmol/L    Potassium 4 3 3 5 - 5 3 mmol/L    Chloride 102 98 - 110 mmol/L    CO2 28 20 - 32 mmol/L    Calcium 9 3 8 6 - 10 2 mg/dL    Protein, Total 6 7 6 1 - 8 1 g/dL    Albumin 4 1 3 6 - 5 1 g/dL    Globulin 2 6 1 9 - 3 7 g/dL (calc)    Albumin/Globulin Ratio 1 6 1 0 - 2 5 (calc)    TOTAL BILIRUBIN 0 3 0 2 - 1 2 mg/dL    Alkaline Phosphatase 51 31 - 125 U/L    AST 15 10 - 30 U/L    ALT 16 6 - 29 U/L   CBC and differential   Result Value Ref Range    White Blood Cell Count 8 1 3 8 - 10 8 Thousand/uL    Red Blood Cell Count 4 93 3 80 - 5 10 Million/uL    Hemoglobin 14 6 11 7 - 15 5 g/dL    HCT 44 3 35 0 - 45 0 %    MCV 89 9 80 0 - 100 0 fL    MCH 29 6 27 0 - 33 0 pg    MCHC 33 0 32 0 - 36 0 g/dL    RDW 12 4 11 0 - 15 0 %    Platelet Count 308 505 - 400 Thousand/uL    SL AMB MPV 10 1 7 5 - 12 5 fL    Neutrophils (Absolute) 4,139 1,500 - 7,800 cells/uL    Lymphocytes (Absolute) 2,924 850 - 3,900 cells/uL    Monocytes (Absolute) 632 200 - 950 cells/uL    Eosinophils (Absolute) 348 15 - 500 cells/uL    Basophils ABS 57 0 - 200 cells/uL    Neutrophils 51 1 %    Lymphocytes 36 1 %    Monocytes 7 8 %    Eosinophils 4 3 %    Basophils PCT 0 7 %   TSH, 3rd generation with Free T4 reflex   Result Value Ref Range    TSH W/RFX TO FREE T4 2 42 mIU/L         ALLERGIES:  No Known Allergies    Current Medications     Current Outpatient Medications   Medication Sig Dispense Refill    buPROPion (WELLBUTRIN XL) 300 mg 24 hr tablet       Cetirizine HCl (ZYRTEC ALLERGY PO) Take by mouth daily      FLUoxetine (PROzac) 20 mg capsule Take 20 mg by mouth daily      multivitamin (THERAGRAN) TABS Take 1 tablet by mouth daily      norethindrone-ethinyl estradiol-ferrous fumarate (Zara 24 Fe) 1-20 MG-MCG(24) per tablet Take 1 tablet by mouth daily 84 tablet 3    fluticasone (FLONASE) 50 mcg/act nasal spray 2 sprays into each nostril daily 16 g 2     No current facility-administered medications for this visit           Henna Rai PA-C  3/30/2022 10:41 AM  [facility]

## 2022-06-12 NOTE — PROGRESS NOTES
FAMILY PRACTICE OFFICE VISIT  [facility] - [facility] PRIMARY CARE       NAME: Rika Staley  AGE: 31 y o  SEX: female       : 1985        MRN: [id number]    DATE: 2022  TIME: 7:44 PM    Assessment and Plan     Problem List Items Addressed This Visit        Cardiovascular and Mediastinum    Primary hypertension - Primary     Her BP is still elevated today  Will start HCTZ 25 mg daily due to some mild swelling in the past  Check BMP in 2 weeks  Return in 1 month for recheck on BP  Monitor at home  Call with any concerns prior to next visit  Continue to work on weight loss  Limit caffeine and salt  Relevant Medications    Blood Pressure Monitor MISC    hydrochlorothiazide (HYDRODIURIL) 25 mg tablet    Other Relevant Orders    Basic metabolic panel       Other    Morbid obesity ([facility] [address] )     Improving  Continue working on diet and exercise changes  Will recheck next visit  BMI Counseling: Body mass index is 44 38 kg/m²  The BMI is above normal  Nutrition recommendations include decreasing overall calorie intake and 3-5 servings of fruits/vegetables daily  Exercise recommendations include moderate aerobic physical activity for 150 minutes/week and exercising 3-5 times per week  Primary hypertension  Her BP is still elevated today  Will start HCTZ 25 mg daily due to some mild swelling in the past  Check BMP in 2 weeks  Return in 1 month for recheck on BP  Monitor at home  Call with any concerns prior to next visit  Continue to work on weight loss  Limit caffeine and salt  Morbid obesity ([facility] [address] )  Improving  Continue working on diet and exercise changes  Will recheck next visit  BMI Counseling: Body mass index is 44 38 kg/m²  The BMI is above normal  Nutrition recommendations include decreasing overall calorie intake and 3-5 servings of fruits/vegetables daily   Exercise recommendations include moderate aerobic physical activity for 150 minutes/week and exercising 3-5 times per week  Chief Complaint     Chief Complaint   Patient presents with    Follow-up       History of Present Illness   Jewell Coffman is a 31y o -year-old female who presents for 2 month follow-up on blood pressure  At the patient's visit 2 months ago, her blood pressure was noted to be mildly elevated at 128/92  She is not currently on any blood pressure medication  Blood pressure readings at home are not checked  The patient denies symptoms of poor control such as chest pain, shortness of breath, leg swelling, vision changes, headaches, or dizziness  The patient reports 1 cup coffee somedays for caffeine intake and limited salt intake  Since the patent's last visit, weight has decreased about 6 pounds  Diet is reported to be rough at the end of the school year but getting back on track  Exercise is restarting today at the gym  Review of Systems   Review of Systems   Constitutional: Negative for chills and fever  Respiratory: Negative for shortness of breath  Cardiovascular: Negative for chest pain, palpitations and leg swelling  Neurological: Negative for dizziness and headaches         Active Problem List     Patient Active Problem List   Diagnosis    Morbid obesity ([address] )    Generalized anxiety disorder    Recurrent depressive disorder, current episode mild ([address] )    OCD (obsessive compulsive disorder)    Change in vision    Encounter for surveillance of contraceptive pills    Dermatitis    Encounter for gynecological examination (general) (routine) without abnormal findings    Family history of colon cancer in mother   Sonia Pearson Dyspareunia in female    Primary hypertension         Past Medical History:  Past Medical History:   Diagnosis Date    Anxiety     Migraine     Varicella     childhood    Varicose veins of both lower extremities        Past Surgical History:  Past Surgical History:   Procedure Laterality Date    NO PAST SURGERIES      OR COLONOSCOPY FLX DX W/COLLJ SPEC WHEN PFRMD N/A 7/17/2018    Procedure: COLONOSCOPY;  Surgeon: Ilia Caicedo MD;  Location: [facility];   Service: Colorectal       Family History:  Family History   Problem Relation Age of Onset   Jocelynn Slipper Cancer Mother         colon,liver,lung    Depression Mother     No Known Problems Father     Deep vein thrombosis Brother     Thrombophilia Brother     No Known Problems Maternal Grandmother     No Known Problems Maternal Grandfather     Ulcerative colitis Maternal Aunt     Ulcerative colitis Maternal Uncle     Crohn's disease Maternal Uncle        Social History:  Social History     Socioeconomic History    Marital status: Single     Spouse name: Not on file    Number of children: Not on file    Years of education: Not on file    Highest education level: Not on file   Occupational History    Not on file   Tobacco Use    Smoking status: Never Smoker    Smokeless tobacco: Never Used   Vaping Use    Vaping Use: Never used   Substance and Sexual Activity    Alcohol use: Yes     Comment: Social - about 1 beer a month    Drug use: Yes     Types: Marijuana     Comment: medical marijuana    Sexual activity: Yes     Partners: Male     Birth control/protection: OCP   Other Topics Concern    Not on file   Social History Narrative    Uses seat belt    Daily coffee consumption     Social Determinants of Health     Financial Resource Strain: Not on file   Food Insecurity: Not on file   Transportation Needs: Not on file   Physical Activity: Not on file   Stress: Not on file   Social Connections: Not on file   Intimate Partner Violence: Not on file   Housing Stability: Not on file       Objective     Vitals:    06/13/22 1127   BP: 124/96   BP Location: Left arm   Patient Position: Sitting   Cuff Size: Large   Pulse: 93   SpO2: 97%   Weight: (!) 144 kg (318 lb 3 2 oz)   Height: 5' 11" (1 803 m)     Wt Readings from Last 3 Encounters:   06/13/22 (!) 144 kg (318 lb 3 2 oz)   03/30/22 (!) 147 kg (324 lb)   03/11/22 (!) 147 kg (323 lb)       Physical Exam  Vitals reviewed  Constitutional:       General: She is not in acute distress  Appearance: Normal appearance  She is well-developed  She is obese  She is not ill-appearing  HENT:      Head: Normocephalic and atraumatic  Neck:      Thyroid: No thyromegaly  Cardiovascular:      Rate and Rhythm: Normal rate and regular rhythm  Pulses: Normal pulses  Heart sounds: Normal heart sounds  No murmur heard  Comments: No carotid bruits noted  Pulmonary:      Effort: Pulmonary effort is normal       Breath sounds: Normal breath sounds  No wheezing, rhonchi or rales  Musculoskeletal:      Cervical back: Neck supple  Right lower leg: No edema  Left lower leg: No edema  Lymphadenopathy:      Cervical: No cervical adenopathy  Neurological:      Mental Status: She is alert  Psychiatric:         Mood and Affect: Mood normal          Behavior: Behavior normal          Thought Content:  Thought content normal          Judgment: Judgment normal          Pertinent Laboratory/Diagnostic Studies:  Lab Results   Component Value Date    BUN 11 03/07/2022    CREATININE 0 91 03/07/2022    CALCIUM 9 3 03/07/2022    K 4 3 03/07/2022    CO2 28 03/07/2022     03/07/2022     Lab Results   Component Value Date    ALT 16 03/07/2022    AST 15 03/07/2022    ALKPHOS 51 03/07/2022       Lab Results   Component Value Date    WBC 8 1 03/07/2022    HGB 14 6 03/07/2022    HCT 44 3 03/07/2022    MCV 89 9 03/07/2022     03/07/2022     Lab Results   Component Value Date    TRIG 101 03/07/2022     Lab Results   Component Value Date    HDL 56 03/07/2022     Lab Results   Component Value Date    LDLCALC 97 03/07/2022     Results for orders placed or performed in visit on 03/07/22   Lipid Panel with Direct LDL reflex   Result Value Ref Range    Total Cholesterol 172 <200 mg/dL    HDL 56 > OR = 50 mg/dL    Triglycerides 101 <150 mg/dL    LDL Calculated 97 mg/dL (calc)    Chol HDLC Ratio 3 1 <5 0 (calc)    Non-HDL Cholesterol 116 <130 mg/dL (calc)   Comprehensive metabolic panel   Result Value Ref Range    Glucose, Random 91 65 - 99 mg/dL    BUN 11 7 - 25 mg/dL    Creatinine 0 91 0 50 - 1 10 mg/dL    eGFR Non  82 > OR = 60 mL/min/1 73m2    eGFR  95 > OR = 60 mL/min/1 73m2    SL AMB BUN/CREATININE RATIO NOT APPLICABLE 6 - 22 (calc)    Sodium 139 135 - 146 mmol/L    Potassium 4 3 3 5 - 5 3 mmol/L    Chloride 102 98 - 110 mmol/L    CO2 28 20 - 32 mmol/L    Calcium 9 3 8 6 - 10 2 mg/dL    Protein, Total 6 7 6 1 - 8 1 g/dL    Albumin 4 1 3 6 - 5 1 g/dL    Globulin 2 6 1 9 - 3 7 g/dL (calc)    Albumin/Globulin Ratio 1 6 1 0 - 2 5 (calc)    TOTAL BILIRUBIN 0 3 0 2 - 1 2 mg/dL    Alkaline Phosphatase 51 31 - 125 U/L    AST 15 10 - 30 U/L    ALT 16 6 - 29 U/L   CBC and differential   Result Value Ref Range    White Blood Cell Count 8 1 3 8 - 10 8 Thousand/uL    Red Blood Cell Count 4 93 3 80 - 5 10 Million/uL    Hemoglobin 14 6 11 7 - 15 5 g/dL    HCT 44 3 35 0 - 45 0 %    MCV 89 9 80 0 - 100 0 fL    MCH 29 6 27 0 - 33 0 pg    MCHC 33 0 32 0 - 36 0 g/dL    RDW 12 4 11 0 - 15 0 %    Platelet Count 278 896 - 400 Thousand/uL    SL AMB MPV 10 1 7 5 - 12 5 fL    Neutrophils (Absolute) 4,139 1,500 - 7,800 cells/uL    Lymphocytes (Absolute) 2,924 850 - 3,900 cells/uL    Monocytes (Absolute) 632 200 - 950 cells/uL    Eosinophils (Absolute) 348 15 - 500 cells/uL    Basophils ABS 57 0 - 200 cells/uL    Neutrophils 51 1 %    Lymphocytes 36 1 %    Monocytes 7 8 %    Eosinophils 4 3 %    Basophils PCT 0 7 %   TSH, 3rd generation with Free T4 reflex   Result Value Ref Range    TSH W/RFX TO FREE T4 2 42 mIU/L         ALLERGIES:  No Known Allergies    Current Medications     Current Outpatient Medications   Medication Sig Dispense Refill    Blood Pressure Monitor MISC Use to check blood pressure once daily   1 each 0    buPROPion (WELLBUTRIN XL) 300 mg 24 hr tablet       Cetirizine HCl (ZYRTEC ALLERGY PO) Take by mouth daily      FLUoxetine (PROzac) 20 mg capsule Take 20 mg by mouth daily      fluticasone (FLONASE) 50 mcg/act nasal spray 2 sprays into each nostril daily 16 g 2    hydrochlorothiazide (HYDRODIURIL) 25 mg tablet Take 1 tablet (25 mg total) by mouth daily 30 tablet 1    multivitamin (THERAGRAN) TABS Take 1 tablet by mouth daily      norethindrone-ethinyl estradiol-ferrous fumarate (Zara 24 Fe) 1-20 MG-MCG(24) per tablet Take 1 tablet by mouth daily 84 tablet 3     No current facility-administered medications for this visit           Health Maintenance     Health Maintenance   Topic Date Due    Hepatitis C Screening  Never done    COVID-19 Vaccine (2 - Moderna series) 01/26/2022    Cervical Cancer Screening  02/06/2023    Annual Physical  03/11/2023    Depression Remission PHQ  03/11/2023    BMI: Adult  06/13/2023    BMI: Followup Plan  06/14/2023    DTaP,Tdap,and Td Vaccines (3 - Td or Tdap) 04/19/2028    HIV Screening  Completed    Influenza Vaccine  Completed    Pneumococcal Vaccine: Pediatrics (0 to 5 Years) and At-Risk Patients (6 to 59 Years)  Aged Out    HIB Vaccine  Aged Out    Hepatitis B Vaccine  Aged Out    IPV Vaccine  Aged Out    Hepatitis A Vaccine  Aged Out    Meningococcal ACWY Vaccine  Aged Out    HPV Vaccine  Aged [employer] History   Administered Date(s) Administered    COVID-19 MODERNA VACC 0 5 ML IM 12/29/2021    INFLUENZA 12/28/2012, 01/16/2014, 11/06/2014, 12/22/2015, 11/10/2017, 01/16/2020, 11/19/2020, 12/20/2021    Influenza Quadrivalent Preservative Free 3 years and older IM 11/10/2017    Meningococcal MCV4P 08/07/2008    Td (adult), Unspecified 08/07/2008    Tdap 04/19/2018       Angelo Britt PA-C  6/14/2022 7:44 PM  [place] [id number] [facility]

## 2022-06-13 ENCOUNTER — OFFICE VISIT (OUTPATIENT)
Dept: FAMILY MEDICINE CLINIC | Facility: CLINIC | Age: 37
End: 2022-06-13
Payer: COMMERCIAL

## 2022-06-13 VITALS
WEIGHT: 293 LBS | HEIGHT: 71 IN | DIASTOLIC BLOOD PRESSURE: 96 MMHG | OXYGEN SATURATION: 97 % | BODY MASS INDEX: 41.02 KG/M2 | SYSTOLIC BLOOD PRESSURE: 124 MMHG | HEART RATE: 93 BPM

## 2022-06-13 DIAGNOSIS — E66.01 MORBID OBESITY (HCC): ICD-10-CM

## 2022-06-13 DIAGNOSIS — I10 PRIMARY HYPERTENSION: Primary | ICD-10-CM

## 2022-06-13 PROCEDURE — 3008F BODY MASS INDEX DOCD: CPT | Performed by: PHYSICIAN ASSISTANT

## 2022-06-13 PROCEDURE — 1036F TOBACCO NON-USER: CPT | Performed by: PHYSICIAN ASSISTANT

## 2022-06-13 PROCEDURE — 99213 OFFICE O/P EST LOW 20 MIN: CPT | Performed by: PHYSICIAN ASSISTANT

## 2022-06-13 RX ORDER — HYDROCHLOROTHIAZIDE 25 MG/1
25 TABLET ORAL DAILY
Qty: 30 TABLET | Refills: 1 | Status: SHIPPED | OUTPATIENT
Start: 2022-06-13

## 2022-06-13 RX ORDER — BENZOCAINE/MENTHOL 6 MG-10 MG
LOZENGE MUCOUS MEMBRANE
Qty: 1 EACH | Refills: 0 | Status: SHIPPED | OUTPATIENT
Start: 2022-06-13

## 2022-06-14 PROBLEM — I10 PRIMARY HYPERTENSION: Status: ACTIVE | Noted: 2022-06-14

## 2022-06-14 NOTE — ASSESSMENT & PLAN NOTE
Her BP is still elevated today  Will start HCTZ 25 mg daily due to some mild swelling in the past  Check BMP in 2 weeks  Return in 1 month for recheck on BP  Monitor at home  Call with any concerns prior to next visit  Continue to work on weight loss  Limit caffeine and salt

## 2022-06-14 NOTE — ASSESSMENT & PLAN NOTE
Improving  Continue working on diet and exercise changes  Will recheck next visit  BMI Counseling: Body mass index is 44 38 kg/m²  The BMI is above normal  Nutrition recommendations include decreasing overall calorie intake and 3-5 servings of fruits/vegetables daily  Exercise recommendations include moderate aerobic physical activity for 150 minutes/week and exercising 3-5 times per week

## 2022-07-05 ENCOUNTER — RA CDI HCC (OUTPATIENT)
Dept: OTHER | Facility: HOSPITAL | Age: 37
End: 2022-07-05

## 2022-07-05 NOTE — PROGRESS NOTES
[place] [id number]  coding opportunities       Chart Reviewed number of suggestions sent to Provider: 2     Patients Insurance     Commercial Insurance: [facility]       E66 [id number]    F33 0: Recurrent depressive disorder, current episode mild ([place] [id number] )

## 2022-07-07 NOTE — PROGRESS NOTES
FAMILY PRACTICE OFFICE VISIT  [facility] - Formerly [facility] PRIMARY CARE       NAME: Lang Rose  AGE: 31 y o  SEX: female       : 1985        MRN: [id number]    DATE: 2022  TIME: 2:26 AM    Assessment and Plan     Problem List Items Addressed This Visit        Cardiovascular and Mediastinum    Primary hypertension - Primary    Relevant Medications    lisinopril (ZESTRIL) 20 mg tablet    Other Relevant Orders    Basic metabolic panel          No problem-specific Assessment & Plan notes found for this encounter  There are no Patient Instructions on file for this visit  Chief Complaint     Chief Complaint   Patient presents with    Follow-up       History of Present Illness   Lang Rose is a 31y o -year-old female who presents for 1 month follow-up on HTN  The patient reports that current blood pressure medications include HCTZ 25 mg daily (started last visit)  Blood pressure readings at home are not checked  The patient denies symptoms of poor control such as chest pain, shortness of breath, leg swelling, vision changes, headaches, or dizziness  The patient reports no caffeine intake and limited salt intake  She is noting significant constipation from this medication  Review of Systems   Review of Systems   Constitutional: Negative for chills and fever  Respiratory: Negative for shortness of breath  Cardiovascular: Negative for chest pain, palpitations and leg swelling  Gastrointestinal: Positive for constipation  Neurological: Negative for dizziness and headaches         Active Problem List     Patient Active Problem List   Diagnosis    Morbid obesity ([address] )    Generalized anxiety disorder    Recurrent depressive disorder, current episode mild ([address] )    OCD (obsessive compulsive disorder)    Change in vision    Encounter for surveillance of contraceptive pills    Dermatitis    Encounter for gynecological examination (general) (routine) without abnormal findings    Family history of colon cancer in mother   Yonathan Crawley Dyspareunia in female    Primary hypertension         Past Medical History:  Past Medical History:   Diagnosis Date    Anxiety     Migraine     Varicella     childhood    Varicose veins of both lower extremities        Past Surgical History:  Past Surgical History:   Procedure Laterality Date    NO PAST SURGERIES      MO COLONOSCOPY FLX DX W/COLLJ SPEC WHEN PFRMD N/A 7/17/2018    Procedure: COLONOSCOPY;  Surgeon: Zhen Sin MD;  Location: [facility];   Service: Colorectal       Family History:  Family History   Problem Relation Age of Onset   Yonathan Crawley Cancer Mother         colon,liver,lung    Depression Mother     No Known Problems Father     Deep vein thrombosis Brother     Thrombophilia Brother     No Known Problems Maternal Grandmother     No Known Problems Maternal Grandfather     Ulcerative colitis Maternal Aunt     Ulcerative colitis Maternal Uncle     Crohn's disease Maternal Uncle        Social History:  Social History     Socioeconomic History    Marital status: Single     Spouse name: Not on file    Number of children: Not on file    Years of education: Not on file    Highest education level: Not on file   Occupational History    Not on file   Tobacco Use    Smoking status: Never Smoker    Smokeless tobacco: Never Used   Vaping Use    Vaping Use: Never used   Substance and Sexual Activity    Alcohol use: Yes     Comment: Social - about 1 beer a month    Drug use: Yes     Types: Marijuana     Comment: medical marijuana    Sexual activity: Yes     Partners: Male     Birth control/protection: OCP   Other Topics Concern    Not on file   Social History Narrative    Uses seat belt    Daily coffee consumption     Social Determinants of Health     Financial Resource Strain: Not on file   Food Insecurity: Not on file   Transportation Needs: Not on file   Physical Activity: Not on file   Stress: Not on file Social Connections: Not on file   Intimate Partner Violence: Not on file   Housing Stability: Not on file       Objective     Vitals:    07/11/22 1408 07/11/22 1446   BP: 112/88 128/86   BP Location: Left arm    Patient Position: Sitting    Cuff Size: Large    Pulse: 103    SpO2: 97%    Weight: (!) 144 kg (317 lb 12 8 oz)    Height: 5' 11" (1 803 m)      Wt Readings from Last 3 Encounters:   07/11/22 (!) 144 kg (317 lb 12 8 oz)   06/13/22 (!) 144 kg (318 lb 3 2 oz)   03/30/22 (!) 147 kg (324 lb)       Physical Exam  Vitals reviewed  Constitutional:       General: She is not in acute distress  Appearance: Normal appearance  She is well-developed  She is obese  She is not ill-appearing  HENT:      Head: Normocephalic and atraumatic  Neck:      Thyroid: No thyromegaly  Cardiovascular:      Rate and Rhythm: Normal rate and regular rhythm  Pulses: Normal pulses  Heart sounds: Normal heart sounds  No murmur heard  Pulmonary:      Effort: Pulmonary effort is normal       Breath sounds: Normal breath sounds  No wheezing or rales  Musculoskeletal:      Cervical back: Neck supple  Right lower leg: No edema  Left lower leg: No edema  Lymphadenopathy:      Cervical: No cervical adenopathy  Neurological:      Mental Status: She is alert  Psychiatric:         Mood and Affect: Mood normal          Behavior: Behavior normal          Thought Content:  Thought content normal          Judgment: Judgment normal          Pertinent Laboratory/Diagnostic Studies:  Lab Results   Component Value Date    BUN 11 03/07/2022    CREATININE 0 91 03/07/2022    CALCIUM 9 3 03/07/2022    K 4 3 03/07/2022    CO2 28 03/07/2022     03/07/2022     Lab Results   Component Value Date    ALT 16 03/07/2022    AST 15 03/07/2022    ALKPHOS 51 03/07/2022       Lab Results   Component Value Date    WBC 8 1 03/07/2022    HGB 14 6 03/07/2022    HCT 44 3 03/07/2022    MCV 89 9 03/07/2022     03/07/2022 Lab Results   Component Value Date    TRIG 101 03/07/2022     Lab Results   Component Value Date    HDL 56 03/07/2022     Lab Results   Component Value Date    LDLCALC 97 03/07/2022     Results for orders placed or performed in visit on 03/07/22   Lipid Panel with Direct LDL reflex   Result Value Ref Range    Total Cholesterol 172 <200 mg/dL    HDL 56 > OR = 50 mg/dL    Triglycerides 101 <150 mg/dL    LDL Calculated 97 mg/dL (calc)    Chol HDLC Ratio 3 1 <5 0 (calc)    Non-HDL Cholesterol 116 <130 mg/dL (calc)   Comprehensive metabolic panel   Result Value Ref Range    Glucose, Random 91 65 - 99 mg/dL    BUN 11 7 - 25 mg/dL    Creatinine 0 91 0 50 - 1 10 mg/dL    eGFR Non  82 > OR = 60 mL/min/1 73m2    eGFR  95 > OR = 60 mL/min/1 73m2    SL AMB BUN/CREATININE RATIO NOT APPLICABLE 6 - 22 (calc)    Sodium 139 135 - 146 mmol/L    Potassium 4 3 3 5 - 5 3 mmol/L    Chloride 102 98 - 110 mmol/L    CO2 28 20 - 32 mmol/L    Calcium 9 3 8 6 - 10 2 mg/dL    Protein, Total 6 7 6 1 - 8 1 g/dL    Albumin 4 1 3 6 - 5 1 g/dL    Globulin 2 6 1 9 - 3 7 g/dL (calc)    Albumin/Globulin Ratio 1 6 1 0 - 2 5 (calc)    TOTAL BILIRUBIN 0 3 0 2 - 1 2 mg/dL    Alkaline Phosphatase 51 31 - 125 U/L    AST 15 10 - 30 U/L    ALT 16 6 - 29 U/L   CBC and differential   Result Value Ref Range    White Blood Cell Count 8 1 3 8 - 10 8 Thousand/uL    Red Blood Cell Count 4 93 3 80 - 5 10 Million/uL    Hemoglobin 14 6 11 7 - 15 5 g/dL    HCT 44 3 35 0 - 45 0 %    MCV 89 9 80 0 - 100 0 fL    MCH 29 6 27 0 - 33 0 pg    MCHC 33 0 32 0 - 36 0 g/dL    RDW 12 4 11 0 - 15 0 %    Platelet Count 329 145 - 400 Thousand/uL    SL AMB MPV 10 1 7 5 - 12 5 fL    Neutrophils (Absolute) 4,139 1,500 - 7,800 cells/uL    Lymphocytes (Absolute) 2,924 850 - 3,900 cells/uL    Monocytes (Absolute) 632 200 - 950 cells/uL    Eosinophils (Absolute) 348 15 - 500 cells/uL    Basophils ABS 57 0 - 200 cells/uL    Neutrophils 51 1 % Lymphocytes 36 1 %    Monocytes 7 8 %    Eosinophils 4 3 %    Basophils PCT 0 7 %   TSH, 3rd generation with Free T4 reflex   Result Value Ref Range    TSH W/RFX TO FREE T4 2 42 mIU/L         ALLERGIES:  No Known Allergies    Current Medications     Current Outpatient Medications   Medication Sig Dispense Refill    Blood Pressure Monitor MISC Use to check blood pressure once daily  1 each 0    buPROPion (WELLBUTRIN XL) 300 mg 24 hr tablet       Cetirizine HCl (ZYRTEC ALLERGY PO) Take by mouth daily      FLUoxetine (PROzac) 20 mg capsule Take 20 mg by mouth daily      fluticasone (FLONASE) 50 mcg/act nasal spray 2 sprays into each nostril daily 16 g 2    hydrochlorothiazide (HYDRODIURIL) 25 mg tablet Take 1 tablet (25 mg total) by mouth daily 30 tablet 1    lisinopril (ZESTRIL) 20 mg tablet Take 1 tablet (20 mg total) by mouth daily 30 tablet 1    multivitamin (THERAGRAN) TABS Take 1 tablet by mouth daily      norethindrone-ethinyl estradiol-ferrous fumarate (Zara 24 Fe) 1-20 MG-MCG(24) per tablet Take 1 tablet by mouth daily 84 tablet 3     No current facility-administered medications for this visit           Health Maintenance     Health Maintenance   Topic Date Due    Hepatitis C Screening  Never done    COVID-19 Vaccine (2 - Moderna series) 01/26/2022    Influenza Vaccine (1) 09/01/2022    Cervical Cancer Screening  02/06/2023    Annual Physical  03/11/2023    Depression Remission PHQ  03/11/2023    BMI: Followup Plan  06/14/2023    BMI: Adult  07/11/2023    DTaP,Tdap,and Td Vaccines (3 - Td or Tdap) 04/19/2028    HIV Screening  Completed    Pneumococcal Vaccine: Pediatrics (0 to 5 Years) and At-Risk Patients (6 to 59 Years)  Aged Out    HIB Vaccine  Aged Out    Hepatitis B Vaccine  Aged Out    IPV Vaccine  Aged Out    Hepatitis A Vaccine  Aged Out    Meningococcal ACWY Vaccine  Aged Out    HPV Vaccine  Aged [employer] History   Administered Date(s) Administered    COVID-19 MODERNA VACC 0 5 ML IM 12/29/2021    INFLUENZA 12/28/2012, 01/16/2014, 11/06/2014, 12/22/2015, 11/10/2017, 01/16/2020, 11/19/2020, 12/20/2021    Influenza Quadrivalent Preservative Free 3 years and older IM 11/10/2017    Meningococcal MCV4P 08/07/2008    Td (adult), Unspecified 08/07/2008    Tdap 04/19/2018       Karen Cagle PA-C  7/12/2022 2:26 AM  [facility]

## 2022-07-11 ENCOUNTER — OFFICE VISIT (OUTPATIENT)
Dept: FAMILY MEDICINE CLINIC | Facility: CLINIC | Age: 37
End: 2022-07-11
Payer: COMMERCIAL

## 2022-07-11 VITALS
HEART RATE: 103 BPM | HEIGHT: 71 IN | OXYGEN SATURATION: 97 % | WEIGHT: 293 LBS | BODY MASS INDEX: 41.02 KG/M2 | DIASTOLIC BLOOD PRESSURE: 86 MMHG | SYSTOLIC BLOOD PRESSURE: 128 MMHG

## 2022-07-11 DIAGNOSIS — I10 PRIMARY HYPERTENSION: Primary | ICD-10-CM

## 2022-07-11 PROCEDURE — 99213 OFFICE O/P EST LOW 20 MIN: CPT | Performed by: PHYSICIAN ASSISTANT

## 2022-07-11 RX ORDER — LISINOPRIL 20 MG/1
20 TABLET ORAL DAILY
Qty: 30 TABLET | Refills: 1 | Status: SHIPPED | OUTPATIENT
Start: 2022-07-11 | End: 2022-08-15 | Stop reason: SDUPTHER

## 2022-07-12 NOTE — ASSESSMENT & PLAN NOTE
Improved but having severe constipation with HCTZ  Will change to lisinopril 20 mg daily  Patient has no intent of pregnancy again  She is on birth control and reports very consistent use  She is also discussing with her  a vasectomy for him  Follow-up in 1 month

## 2022-08-14 NOTE — PROGRESS NOTES
FAMILY PRACTICE OFFICE VISIT  [facility] - [facility]IMARY [facility]ARE       NAME: Abad Pineda  AGE: 31 y o  SEX: female       : 1985        MRN: [id number]    DATE: 8/15/2022  TIME: 10:35 AM    Assessment and Plan     Problem List Items Addressed This Visit        Cardiovascular and Mediastinum    Primary hypertension     Uncontrolled/borderline  Increase lisinopril to 40 mg daily  Recheck BMP as previously ordered  Consider decrease in bupropion if not controlled next visit  Continue to limit caffeine and salt  Recheck in 1 month  Call with concerns in the interim  Relevant Medications    lisinopril (ZESTRIL) 40 mg tablet          Primary hypertension  Uncontrolled/borderline  Increase lisinopril to 40 mg daily  Recheck BMP as previously ordered  Consider decrease in bupropion if not controlled next visit  Continue to limit caffeine and salt  Recheck in 1 month  Call with concerns in the interim  Chief Complaint     Chief Complaint   Patient presents with    Follow-up       History of Present Illness   Abad Pineda is a 31y o -year-old female who presents for one-month follow-up on hypertension  The patient reports that current blood pressure medications include lisinopril 20 mg daily her (switched from hydrochlorothiazide due to significant constipation on medication)  Blood pressure readings at home are approximately 130-140s/70-80s  The patient denies symptoms of poor control such as chest pain, shortness of breath, leg swelling, vision changes, headaches, but has slight dizziness/lightheadedness with bending over and coming back up  The patient reports no caffeine intake and limited salt intake  Patient notes that she has started with a new therapist, Lennox Osborne  Will see her again this week  Review of Systems   Review of Systems   Constitutional: Negative for chills and fever  Respiratory: Negative for shortness of breath  Cardiovascular: Negative for chest pain, palpitations and leg swelling  Neurological: Positive for light-headedness (occ with bending forward and coming back up)  Negative for dizziness and headaches  Active Problem List     Patient Active Problem List   Diagnosis    Morbid [address] ([facility] [address]    Generalized anxiety disorder    Recurrent depressive disorder, current episode mild ([facility] [address] )    OCD (obsessive compulsive disorder)    Change in vision    Encounter for surveillance of contraceptive pills    Dermatitis    Encounter for gynecological examination (general) (routine) without abnormal findings    Family history of colon cancer in mother   Jefferson Godwin Dyspareunia in female    Primary hypertension         Past Medical History:  Past Medical History:   Diagnosis Date    Anxiety     Migraine     Varicella     childhood    Varicose veins of both lower extremities        Past Surgical History:  Past Surgical History:   Procedure Laterality Date    NO PAST SURGERIES      CA COLONOSCOPY FLX DX W/COLLJ SPEC WHEN PFRMD N/A 7/17/2018    Procedure: COLONOSCOPY;  Surgeon: Rene Mas MD;  Location: [facility];   Service: Colorectal       Family History:  Family History   Problem Relation Age of Onset   Jefferson Godwin Cancer Mother         colon,liver,lung    Depression Mother     No Known Problems Father     Deep vein thrombosis Brother     Thrombophilia Brother     No Known Problems Maternal Grandmother     No Known Problems Maternal Grandfather     Ulcerative colitis Maternal Aunt     Ulcerative colitis Maternal Uncle     Crohn's disease Maternal Uncle        Social History:  Social History     Socioeconomic History    Marital status: Single     Spouse name: Not on file    Number of children: Not on file    Years of education: Not on file    Highest education level: Not on file   Occupational History    Not on file   Tobacco Use    Smoking status: Never Smoker    Smokeless tobacco: Never Used   Vaping Use    Vaping Use: Never used   Substance and Sexual Activity    Alcohol use: Yes     Comment: Social - about 1 beer a month    Drug use: Yes     Types: Marijuana     Comment: medical marijuana    Sexual activity: Yes     Partners: Male     Birth control/protection: OCP   Other Topics Concern    Not on file   Social History Narrative    Uses seat belt    Daily coffee consumption     Social Determinants of Health     Financial Resource Strain: Not on file   Food Insecurity: Not on file   Transportation Needs: Not on file   Physical Activity: Not on file   Stress: Not on file   Social Connections: Not on file   Intimate Partner Violence: Not on file   Housing Stability: Not on file       Objective     Vitals:    08/15/22 1012 08/15/22 1033   BP: 128/70 132/90   BP Location: Left arm    Patient Position: Sitting    Cuff Size: Large    Pulse: 94    Resp: 12    SpO2: 97%    Weight: (!) 143 kg (315 lb 3 2 oz)    Height: 5' 11" (1 803 m)      Wt Readings from Last 3 Encounters:   08/15/22 (!) 143 kg (315 lb 3 2 oz)   07/11/22 (!) 144 kg (317 lb 12 8 oz)   06/13/22 (!) 144 kg (318 lb 3 2 oz)       Physical Exam  Vitals reviewed  Constitutional:       General: She is not in acute distress  Appearance: Normal appearance  She is well-developed  She is obese  She is not ill-appearing  HENT:      Head: Normocephalic and atraumatic  Neck:      Thyroid: No thyromegaly  Cardiovascular:      Rate and Rhythm: Normal rate and regular rhythm  Pulses: Normal pulses  Heart sounds: Normal heart sounds  No murmur heard  Comments: No carotid bruits noted  Pulmonary:      Effort: Pulmonary effort is normal       Breath sounds: Normal breath sounds  No wheezing, rhonchi or rales  Musculoskeletal:      Cervical back: Neck supple  Right lower leg: No edema  Left lower leg: No edema  Lymphadenopathy:      Cervical: No cervical adenopathy  Skin:     General: Skin is warm and dry  Findings: No rash  Neurological:      Mental Status: She is alert  Psychiatric:         Mood and Affect: Mood normal          Behavior: Behavior normal          Thought Content:  Thought content normal          Judgment: Judgment normal          Pertinent Laboratory/Diagnostic Studies:  Lab Results   Component Value Date    BUN 11 03/07/2022    CREATININE 0 91 03/07/2022    CALCIUM 9 3 03/07/2022    K 4 3 03/07/2022    CO2 28 03/07/2022     03/07/2022     Lab Results   Component Value Date    ALT 16 03/07/2022    AST 15 03/07/2022    ALKPHOS 51 03/07/2022       Lab Results   Component Value Date    WBC 8 1 03/07/2022    HGB 14 6 03/07/2022    HCT 44 3 03/07/2022    MCV 89 9 03/07/2022     03/07/2022     Lab Results   Component Value Date    TRIG 101 03/07/2022     Lab Results   Component Value Date    HDL 56 03/07/2022     Lab Results   Component Value Date    LDLCALC 97 03/07/2022     Results for orders placed or performed in visit on 03/07/22   Lipid Panel with Direct LDL reflex   Result Value Ref Range    Total Cholesterol 172 <200 mg/dL    HDL 56 > OR = 50 mg/dL    Triglycerides 101 <150 mg/dL    LDL Calculated 97 mg/dL (calc)    Chol HDLC Ratio 3 1 <5 0 (calc)    Non-HDL Cholesterol 116 <130 mg/dL (calc)   Comprehensive metabolic panel   Result Value Ref Range    Glucose, Random 91 65 - 99 mg/dL    BUN 11 7 - 25 mg/dL    Creatinine 0 91 0 50 - 1 10 mg/dL    eGFR Non  82 > OR = 60 mL/min/1 73m2    eGFR  95 > OR = 60 mL/min/1 73m2    SL AMB BUN/CREATININE RATIO NOT APPLICABLE 6 - 22 (calc)    Sodium 139 135 - 146 mmol/L    Potassium 4 3 3 5 - 5 3 mmol/L    Chloride 102 98 - 110 mmol/L    CO2 28 20 - 32 mmol/L    Calcium 9 3 8 6 - 10 2 mg/dL    Protein, Total 6 7 6 1 - 8 1 g/dL    Albumin 4 1 3 6 - 5 1 g/dL    Globulin 2 6 1 9 - 3 7 g/dL (calc)    Albumin/Globulin Ratio 1 6 1 0 - 2 5 (calc)    TOTAL BILIRUBIN 0 3 0 2 - 1 2 mg/dL    Alkaline Phosphatase 51 31 - 125 U/L    AST 15 10 - 30 U/L    ALT 16 6 - 29 U/L   CBC and differential   Result Value Ref Range    White Blood Cell Count 8 1 3 8 - 10 8 Thousand/uL    Red Blood Cell Count 4 93 3 80 - 5 10 Million/uL    Hemoglobin 14 6 11 7 - 15 5 g/dL    HCT 44 3 35 0 - 45 0 %    MCV 89 9 80 0 - 100 0 fL    MCH 29 6 27 0 - 33 0 pg    MCHC 33 0 32 0 - 36 0 g/dL    RDW 12 4 11 0 - 15 0 %    Platelet Count 371 889 - 400 Thousand/uL    SL AMB MPV 10 1 7 5 - 12 5 fL    Neutrophils (Absolute) 4,139 1,500 - 7,800 cells/uL    Lymphocytes (Absolute) 2,924 850 - 3,900 cells/uL    Monocytes (Absolute) 632 200 - 950 cells/uL    Eosinophils (Absolute) 348 15 - 500 cells/uL    Basophils ABS 57 0 - 200 cells/uL    Neutrophils 51 1 %    Lymphocytes 36 1 %    Monocytes 7 8 %    Eosinophils 4 3 %    Basophils PCT 0 7 %   TSH, 3rd generation with Free T4 reflex   Result Value Ref Range    TSH W/RFX TO FREE T4 2 42 mIU/L         ALLERGIES:  No Known Allergies    Current Medications     Current Outpatient Medications   Medication Sig Dispense Refill    Blood Pressure Monitor MISC Use to check blood pressure once daily  1 each 0    buPROPion (WELLBUTRIN XL) 300 mg 24 hr tablet       Cetirizine HCl (ZYRTEC ALLERGY PO) Take by mouth daily      FLUoxetine (PROzac) 20 mg capsule Take 20 mg by mouth daily      lisinopril (ZESTRIL) 40 mg tablet Take 1 tablet (40 mg total) by mouth daily 30 tablet 1    multivitamin (THERAGRAN) TABS Take 1 tablet by mouth daily      norethindrone-ethinyl estradiol-ferrous fumarate (Zara 24 Fe) 1-20 MG-MCG(24) per tablet Take 1 tablet by mouth daily 84 tablet 3    fluticasone (FLONASE) 50 mcg/act nasal spray 2 sprays into each nostril daily (Patient not taking: Reported on 8/15/2022) 16 g 2     No current facility-administered medications for this visit           Health Maintenance     Health Maintenance   Topic Date Due    Hepatitis C Screening  Never done    COVID-19 Vaccine (2 - Moderna series) 01/26/2022    Influenza Vaccine (1) 09/01/2022    Cervical Cancer Screening  02/06/2023    Annual Physical  03/11/2023    Depression Remission PHQ  03/11/2023    BMI: Followup Plan  06/14/2023    BMI: Adult  07/11/2023    DTaP,Tdap,and Td Vaccines (3 - Td or Tdap) 04/19/2028    HIV Screening  Completed    Pneumococcal Vaccine: Pediatrics (0 to 5 Years) and At-Risk Patients (6 to 59 Years)  Aged Out    HIB Vaccine  Aged Out    Hepatitis B Vaccine  Aged Out    IPV Vaccine  Aged Out    Hepatitis A Vaccine  Aged Out    Meningococcal ACWY Vaccine  Aged Out    HPV Vaccine  Aged [employer] History   Administered Date(s) Administered    COVID-19 MODERNA VACC 0 5 ML IM 12/29/2021    INFLUENZA 12/28/2012, 01/16/2014, 11/06/2014, 12/22/2015, 11/10/2017, 01/16/2020, 11/19/2020, 12/20/2021    Influenza Quadrivalent Preservative Free 3 years and older IM 11/10/2017    Meningococcal MCV4P 08/07/2008    Td (adult), Unspecified 08/07/2008    Tdap 04/19/2018       Shelbi Kikr PA-C  8/15/2022 10:35 AM  [facility]

## 2022-08-15 ENCOUNTER — OFFICE VISIT (OUTPATIENT)
Dept: FAMILY MEDICINE CLINIC | Facility: CLINIC | Age: 37
End: 2022-08-15
Payer: COMMERCIAL

## 2022-08-15 VITALS
BODY MASS INDEX: 41.02 KG/M2 | HEART RATE: 94 BPM | RESPIRATION RATE: 12 BRPM | HEIGHT: 71 IN | SYSTOLIC BLOOD PRESSURE: 132 MMHG | OXYGEN SATURATION: 97 % | DIASTOLIC BLOOD PRESSURE: 90 MMHG | WEIGHT: 293 LBS

## 2022-08-15 DIAGNOSIS — E66.01 MORBID OBESITY (HCC): ICD-10-CM

## 2022-08-15 DIAGNOSIS — Z13.220 LIPID SCREENING: ICD-10-CM

## 2022-08-15 DIAGNOSIS — Z13.1 DIABETES MELLITUS SCREENING: ICD-10-CM

## 2022-08-15 DIAGNOSIS — I10 PRIMARY HYPERTENSION: Primary | ICD-10-CM

## 2022-08-15 PROCEDURE — 99213 OFFICE O/P EST LOW 20 MIN: CPT | Performed by: PHYSICIAN ASSISTANT

## 2022-08-15 PROCEDURE — 3725F SCREEN DEPRESSION PERFORMED: CPT | Performed by: PHYSICIAN ASSISTANT

## 2022-08-15 RX ORDER — LISINOPRIL 40 MG/1
40 TABLET ORAL DAILY
Qty: 30 TABLET | Refills: 1 | Status: SHIPPED | OUTPATIENT
Start: 2022-08-15 | End: 2022-09-21 | Stop reason: SDUPTHER

## 2022-08-15 NOTE — ASSESSMENT & PLAN NOTE
Uncontrolled/borderline  Increase lisinopril to 40 mg daily  Recheck BMP as previously ordered  Consider decrease in bupropion if not controlled next visit  Continue to limit caffeine and salt  Recheck in 1 month  Call with concerns in the interim

## 2022-09-03 NOTE — PROGRESS NOTES
Patient w/o complaints  (+) good fetal movement, denies any bleeding, fluid leakage or ctx  1hr GTT- 111, 28wk labs WNL  For [facility] tomorrow  Problem List Items Addressed This Visit     Encounter for supervision of normal first pregnancy in third trimester - Primary     RTO 2 weeks  For [facility] tomorrow    Reviewed PTL precautions, fetal kick counts and reasons to call
Pt doing well today 
0.13